# Patient Record
Sex: FEMALE | Race: WHITE | NOT HISPANIC OR LATINO | Employment: FULL TIME | URBAN - METROPOLITAN AREA
[De-identification: names, ages, dates, MRNs, and addresses within clinical notes are randomized per-mention and may not be internally consistent; named-entity substitution may affect disease eponyms.]

---
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
## 2018-02-23 ENCOUNTER — OFFICE VISIT (OUTPATIENT)
Dept: INTERNAL MEDICINE | Facility: CLINIC | Age: 18
End: 2018-02-23
Payer: COMMERCIAL

## 2018-02-23 VITALS
HEART RATE: 83 BPM | WEIGHT: 217.6 LBS | HEIGHT: 65 IN | RESPIRATION RATE: 16 BRPM | TEMPERATURE: 98.3 F | OXYGEN SATURATION: 97 % | DIASTOLIC BLOOD PRESSURE: 82 MMHG | SYSTOLIC BLOOD PRESSURE: 124 MMHG | BODY MASS INDEX: 36.25 KG/M2

## 2018-02-23 DIAGNOSIS — R07.0 THROAT PAIN: Primary | ICD-10-CM

## 2018-02-23 LAB
DEPRECATED S PYO AG THROAT QL EIA: NORMAL
FLUAV+FLUBV AG SPEC QL: NEGATIVE
FLUAV+FLUBV AG SPEC QL: NEGATIVE
SPECIMEN SOURCE: NORMAL
SPECIMEN SOURCE: NORMAL

## 2018-02-23 PROCEDURE — 99213 OFFICE O/P EST LOW 20 MIN: CPT | Performed by: PHYSICIAN ASSISTANT

## 2018-02-23 PROCEDURE — 87880 STREP A ASSAY W/OPTIC: CPT | Performed by: PHYSICIAN ASSISTANT

## 2018-02-23 PROCEDURE — 87081 CULTURE SCREEN ONLY: CPT | Performed by: PHYSICIAN ASSISTANT

## 2018-02-23 PROCEDURE — 87804 INFLUENZA ASSAY W/OPTIC: CPT | Performed by: PHYSICIAN ASSISTANT

## 2018-02-23 NOTE — PATIENT INSTRUCTIONS
Viral upper respiratory infection:    Symptomatic care is the mainstay of treatment at this time:    You can try a steroid nasal spray, flonase (fluticasone) or a saline nasal spray can be helpful.     You can also try Mucinex.     I would recommend drinking plenty of fluids to stay hydrated.     You can try a decongestant to help dry up some of nasal congestion such as sudafed.     A humidifier can be helpful, if you do not have one you can boil hot water on the stove    Your symptoms should slowly improve in the next 7-10 days    As long as your symptoms are improving, even if it is slow this is a good sign that your body is beating the viral infection.   If symptoms worsen or change (fever, chest pain, shortness of breath or weakness), I would recommend urgent follow up. I would also recommend follow up if your symptoms do not improve.

## 2018-02-23 NOTE — PROGRESS NOTES
"  SUBJECTIVE:   Cleo Flores is a 17 year old female who presents to clinic today for the following health issues:    Chief Complaint   Patient presents with     Throat Problem     Mucuas in the Throat, Sore throat that hurts all the time.      Sinus Problem     L ear is plugged and does hurt on and off, Pt states that she does get a headache on and off, pain above the eyes,  x 1 week     - symptoms: sore throat with mucous and painful swallowing, left ear plugging with pain, headache that comes and goes, post-nasal drip and cold sweats  - denies: cough, runny nose, sinus pain, chest pain   - pt has asthma, but notes no current concerns   - pt was exposed to influenza at work         Problem list and histories reviewed & adjusted, as indicated.  Additional history: as documented    Reviewed and updated as needed this visit by clinical staff  Tobacco  Allergies  Meds  Problems  Soc Hx      Reviewed and updated as needed this visit by Provider  Meds  Problems           OBJECTIVE:     /82  Pulse 83  Temp 98.3  F (36.8  C) (Oral)  Resp 16  Ht 5' 4.75\" (1.645 m)  Wt 217 lb 9.6 oz (98.7 kg)  LMP 02/06/2018  SpO2 97%  Breastfeeding? No  BMI 36.49 kg/m2  Body mass index is 36.49 kg/(m^2).  GENERAL: healthy, alert and no distress  EYES: Eyes grossly normal to inspection, PERRL and conjunctivae and sclerae normal  HENT: ear canals and TM's normal, nose and mouth without ulcers or lesions  NECK: cervical adenopathy mild   RESP: lungs clear to auscultation - no rales, rhonchi or wheezes  CV: regular rates and rhythm, normal S1 S2, no S3 or S4 and no murmur, click or rub    Diagnostic Test Results:  Results for orders placed or performed in visit on 02/23/18 (from the past 24 hour(s))   Strep, Rapid Screen   Result Value Ref Range    Specimen Description Throat     Rapid Strep A Screen       NEGATIVE: No Group A streptococcal antigen detected by immunoassay, await culture report.   Influenza A/B antigen "   Result Value Ref Range    Influenza A/B Agn Specimen Nasal     Influenza A Negative NEG^Negative    Influenza B Negative NEG^Negative       ASSESSMENT/PLAN:       1. Throat pain  - suspect viral origin at this time   - Strep, Rapid Screen  - Influenza A/B antigen  - Beta strep group A culture  - reviewed symptomatic care measures with mucinex, sudafed,  use of a humidifier, rest and hydration   - reviewed viral illnesses should heal in 7-10 days   - if symptoms worsen or fail to improve, follow up is needed   - reviewed worsening symptoms of fever, chest pain or shortness of breath require urgent follow up    - pt agreed to the above plan and all questions are answered       Steffanie Acosta PA-C  [facility]

## 2018-02-23 NOTE — MR AVS SNAPSHOT
After Visit Summary   2/23/2018    Cleo Flores    MRN: [id number]           Patient Information     Date Of Birth          2000        Visit Information        Provider Department      2/23/2018 8:40 AM Steffanie Acosta PA-C [facility]        Today's Diagnoses     Throat pain    -  1      Care Instructions    Viral upper respiratory infection:    Symptomatic care is the mainstay of treatment at this time:    You can try a steroid nasal spray, flonase (fluticasone) or a saline nasal spray can be helpful.     You can also try Mucinex.     I would recommend drinking plenty of fluids to stay hydrated.     You can try a decongestant to help dry up some of nasal congestion such as sudafed.     A humidifier can be helpful, if you do not have one you can boil hot water on the stove    Your symptoms should slowly improve in the next 7-10 days    As long as your symptoms are improving, even if it is slow this is a good sign that your body is beating the viral infection.   If symptoms worsen or change (fever, chest pain, shortness of breath or weakness), I would recommend urgent follow up. I would also recommend follow up if your symptoms do not improve.             Follow-ups after your visit        Who to contact     If you have questions or need follow up information about today's clinic visit or your schedule please contact [facility] directly at [phone].  Normal or non-critical lab and imaging results will be communicated to you by MyChart, letter or phone within 4 business days after the clinic has received the results. If you do not hear from us within 7 days, please contact the clinic through MyChart or phone. If you have a critical or abnormal lab result, we will notify you by phone as soon as possible.  Submit refill requests through [employer] or call your pharmacy and they will forward the refill request to us. Please allow 3 business days  "for your refill to be completed.          Additional Information About Your Visit        MyChart Information     [employer] lets you send messages to your doctor, view your test results, renew your prescriptions, schedule appointments and more. To sign up, go to [email or web address] . Click on \"Log in\" on the left side of the screen, which will take you to the Welcome page. Then click on \"Sign up Now\" on the right side of the page.     You will be asked to enter the access code listed below, as well as some personal information. Please follow the directions to create your username and password.     Your access code is: [email or web address]  Expires: 2018 10:50 PM     Your access code will  in 90 days. If you need help or a new code, please call your [facility] clinic or [phone].        Care EveryWhere ID     This is your [facility] EveryWhere ID. This could be used by other organizations to access your [facility] medical records  [id number]        Your Vitals Were     Pulse Temperature Respirations Height Last Period Pulse Oximetry    83 98.3  F (36.8  C) (Oral) 16 5' 4.75\" (1.645 m) 2018 97%    Breastfeeding? BMI (Body Mass Index)                No 36.49 kg/m2           Blood Pressure from Last 3 Encounters:   18 124/82   17 124/70   17 (!) 139/96    Weight from Last 3 Encounters:   18 217 lb 9.6 oz (98.7 kg) (99 %)*   17 221 lb (100.2 kg) (99 %)*   17 222 lb (100.7 kg) (99 %)*     * Growth percentiles are based on CDC 2-20 Years data.              We Performed the Following     Beta strep group A culture     Influenza A/B antigen     Strep, Rapid Screen        Primary Care Provider Office Phone # Fax #    Linda Martin [phone] [address] [place] [address]        Equal Access to Services     [facility] AH: Jenny Fernandez, emanuel [place], qaybta salvador martinez. " So [facility] [phone].    ATENCIÓN: Si león bruno, tiene a rogers disposición servicios gratuitos de asistencia lingüística. [facility] [phone].    We comply with applicable federal civil rights laws and [place] laws. We do not discriminate on the basis of race, color, national origin, age, disability, sex, sexual orientation, or gender identity.            Thank you!     Thank you for choosing [facility]  for your care. Our goal is always to provide you with excellent care. Hearing back from our patients is one way we can continue to improve our services. Please take a few minutes to complete the written survey that you may receive in the mail after your visit with us. Thank you!             Your Updated Medication List - Protect others around you: Learn how to safely use, store and throw away your medicines at www.disposemymeds.org.          This list is accurate as of 2/23/18  9:33 AM.  Always use your most recent med list.                   Brand Name Dispense Instructions for use Diagnosis    cyclobenzaprine 10 MG tablet    FLEXERIL    30 tablet    Take 0.5-1 tablets (5-10 mg) by mouth 3 times daily as needed for muscle spasms    Tension headache       fluticasone 50 MCG/ACT spray    FLONASE    1 Package    Spray 2 sprays into both nostrils daily    Seasonal allergies       SUMAtriptan 50 MG tablet    IMITREX    18 tablet    Take 1-2 tablets ( mg) by mouth at onset of headache for migraine May repeat in 2 hours. Max 4 tablets/24 hours.    Intractable chronic migraine without aura and without status migrainosus

## 2018-02-24 LAB
BACTERIA SPEC CULT: NORMAL
SPECIMEN SOURCE: NORMAL

## 2018-07-16 ENCOUNTER — TRANSFERRED RECORDS (OUTPATIENT)
Dept: HEALTH INFORMATION MANAGEMENT | Facility: CLINIC | Age: 18
End: 2018-07-16

## 2018-08-28 NOTE — ED PROVIDER NOTES
"  History     Chief Complaint:  Headache    The history is provided by the patient.      Cleo Flores is a 17 year old female with a history of migraines who presents with a headache. The patient states that she developed a headache that started on the right side of her head and traveled to the left. She took a Tylenol at 1400 which helped but after going out to dinner with her coworkers her headache worsened and she began vomiting and experiencing photophobia. She reports that her current headache feels like her typical migraine. Per the patient, she has had migraines since she was 7 and her most recent one was 1 month ago but it was not as severe as today. She is unsure what causes her migraines but she thinks that it ay be related to her sleeping positions. She denies any fever, chills, or other medical concerns. Of note she has never had any head imaging obtained.    Allergies:  The patient is negative for alcohol use.      Medications:    Imitrex    Past Medical History:    Intermittent asthma  Kidney stones  Migraines  MRSA cellulitis    Past Surgical History:    History reviewed. No pertinent surgical history.     Family History:    History reviewed. No pertinent family history.      Social History:  Presents with mother   Tobacco use: 1.00 PPD for the past year  Alcohol use: No  PCP: Linda Martin    Marital Status:  Single     Review of Systems   Constitutional: Negative for chills and fever.   Eyes: Positive for photophobia.   Gastrointestinal: Positive for vomiting.   Neurological: Positive for headaches.   All other systems reviewed and are negative.    Physical Exam     Patient Vitals for the past 24 hrs:   BP Temp Temp src Pulse Resp SpO2 Height Weight   11/28/17 2053 149/85 97.6  F (36.4  C) Oral 91 16 98 % 1.626 m (5' 4\") 100.7 kg (222 lb)      Physical Exam  GENERAL: well developed, pleasant  HEAD: atraumatic  EYES: pupils reactive, extraocular muscles intact, conjunctivae normal  ENT:  " Reviewed below information with Pippa Rodriguez NP, who feels that Mariusz should be seen and evaluated. Called Mariusz back and told him this. Mariusz also had me talk to his family friend Lisa  who is an RN. I told Lisa the nausea and poor appetite are not good signs in a heart failure patient. Also the intermittent chest pain and low BP post exercise concerns us and we feel he needs to be evaluated. Offered to see in [facility] tomorrow or if they have someone up there they can see in next day or so. She states they just established with a cardiologist up there, Dr Dodd, and will call him right now. Lisa thinks she can get him in to see Dr Dodd today. If not I told her we are happy to see him and please call us if need further assistance. She states understanding and agrees with plan.    mucus membranes moist  NECK:  trachea midline, normal range of motion  RESPIRATORY: no tachypnea, breath sounds clear to auscultation   CVS: normal S1/S2, no murmurs, intact distal pulses  ABDOMEN: soft, nontender, nondistention  MUSCULOSKELETAL: no deformities  SKIN: warm and dry, no acute rashes or ulceration  NEURO: GCS 15, cranial nerves intact, alert and oriented x3. Normal finger to nose  PSYCH:  Mood/affect normal     Emergency Department Course   Interventions:  2114: NS 1L IV Bolus   2116: Benadryl 25 mg IV  2119: Compazine 10 mg IV  2125: Toradol 30mg IV injection   2150: Benadryl 25 mg IV    Emergency Department Course:  Past medical records, nursing notes, and vitals reviewed.  2057: I performed an exam of the patient and obtained history, as documented above.      2227: I rechecked the patient. Findings and plan explained to the Patient and mother. Patient discharged home with instructions regarding supportive care, medications, and reasons to return. The importance of close follow-up was reviewed.    Impression & Plan    Medical Decision Making:  Cleo Flores is a 17 year old female presenting with migraines symptoms. I do not suspect aneurysm or infectious etiology. Patient given above treatment with improvement of symptoms. She does have slight restlessness in her legs. She was given a second dose of benadryl. Patient was sleeping but can easily be awoken and the akathisia from the compazine has subsided. Patient will be discharged to home and all questions were answered prior.    Diagnosis:    ICD-10-CM   1. Other migraine without status migrainosus, not intractable G43.809       Disposition:  Discharged to home with plan as outlined.    Discharge Medications:  New Prescriptions    BUTALBITAL-ACETAMINOPHEN-CAFFEINE (FIORICET/ESGIC) -40 MG PER TABLET    Take 1 tablet by mouth every 4 hours as needed    ONDANSETRON (ZOFRAN ODT) 4 MG ODT TAB    Take 1 tablet (4 mg) by mouth every 8 hours as  needed for nausea         James Diamond  11/28/2017    EMERGENCY DEPARTMENT  I, James Diamond, am serving as a scribe at 8:57 PM on 11/28/2017 to document services personally performed by Larry Maldonado MD based on my observations and the provider's statements to me.       Larry Maldonado MD  12/01/17 8630

## 2018-08-30 ENCOUNTER — TRANSFERRED RECORDS (OUTPATIENT)
Dept: HEALTH INFORMATION MANAGEMENT | Facility: CLINIC | Age: 18
End: 2018-08-30

## 2018-11-19 ENCOUNTER — TRANSFERRED RECORDS (OUTPATIENT)
Dept: HEALTH INFORMATION MANAGEMENT | Facility: CLINIC | Age: 18
End: 2018-11-19

## 2018-12-19 DIAGNOSIS — G43.719 INTRACTABLE CHRONIC MIGRAINE WITHOUT AURA AND WITHOUT STATUS MIGRAINOSUS: ICD-10-CM

## 2018-12-21 RX ORDER — SUMATRIPTAN 50 MG/1
TABLET, FILM COATED ORAL
Qty: 18 TABLET | Refills: 0 | Status: SHIPPED | OUTPATIENT
Start: 2018-12-21 | End: 2019-03-02

## 2018-12-21 NOTE — TELEPHONE ENCOUNTER
Medication is being filled for 1 time refill only due to:  Patient needs to be seen because it has been more than one year since last visit.     Odilia Antonio RN -- [facility]

## 2019-04-17 ENCOUNTER — OFFICE VISIT (OUTPATIENT)
Dept: PEDIATRICS | Facility: CLINIC | Age: 19
End: 2019-04-17
Payer: COMMERCIAL

## 2019-04-17 VITALS
OXYGEN SATURATION: 97 % | BODY MASS INDEX: 37.63 KG/M2 | WEIGHT: 220.4 LBS | DIASTOLIC BLOOD PRESSURE: 70 MMHG | RESPIRATION RATE: 16 BRPM | SYSTOLIC BLOOD PRESSURE: 122 MMHG | HEIGHT: 64 IN | TEMPERATURE: 97.6 F | HEART RATE: 84 BPM

## 2019-04-17 DIAGNOSIS — G43.719 INTRACTABLE CHRONIC MIGRAINE WITHOUT AURA AND WITHOUT STATUS MIGRAINOSUS: ICD-10-CM

## 2019-04-17 DIAGNOSIS — Z11.3 ROUTINE SCREENING FOR STI (SEXUALLY TRANSMITTED INFECTION): ICD-10-CM

## 2019-04-17 DIAGNOSIS — Z00.00 ENCOUNTER FOR PREVENTATIVE ADULT HEALTH CARE EXAMINATION: Primary | ICD-10-CM

## 2019-04-17 PROCEDURE — 87591 N.GONORRHOEAE DNA AMP PROB: CPT | Performed by: PEDIATRICS

## 2019-04-17 PROCEDURE — 99395 PREV VISIT EST AGE 18-39: CPT | Mod: GC | Performed by: STUDENT IN AN ORGANIZED HEALTH CARE EDUCATION/TRAINING PROGRAM

## 2019-04-17 PROCEDURE — 87491 CHLMYD TRACH DNA AMP PROBE: CPT | Performed by: PEDIATRICS

## 2019-04-17 RX ORDER — SUMATRIPTAN 50 MG/1
50 TABLET, FILM COATED ORAL
Qty: 15 TABLET | Refills: 11 | Status: SHIPPED | OUTPATIENT
Start: 2019-04-17 | End: 2019-05-22

## 2019-04-17 RX ORDER — SUMATRIPTAN 50 MG/1
TABLET, FILM COATED ORAL
Qty: 9 TABLET | Refills: 0 | Status: SHIPPED | OUTPATIENT
Start: 2019-04-17 | End: 2019-05-22

## 2019-04-17 ASSESSMENT — ENCOUNTER SYMPTOMS
HEMATOCHEZIA: 0
WEAKNESS: 0
JOINT SWELLING: 0
HEADACHES: 1
DIARRHEA: 0
FEVER: 0
CONSTIPATION: 0
ABDOMINAL PAIN: 0
SHORTNESS OF BREATH: 0
DYSURIA: 0
BREAST MASS: 0
ARTHRALGIAS: 0
DIZZINESS: 0
CHILLS: 0
EYE PAIN: 0
SORE THROAT: 0
COUGH: 0
HEARTBURN: 0
FREQUENCY: 0
MYALGIAS: 0
PALPITATIONS: 0
HEMATURIA: 0
NERVOUS/ANXIOUS: 1
PARESTHESIAS: 0
NAUSEA: 0

## 2019-04-17 ASSESSMENT — MIFFLIN-ST. JEOR: SCORE: 1754.73

## 2019-04-17 NOTE — PATIENT INSTRUCTIONS
Thanks for coming in today! Here is what we discussed:     1) STI testing today, we will call with results  2) Migraines - refilled imitrex. But we need to work on preventing migraines! My highest recommendations would be:    A) make time for more sleep   B) get rid of the vape pen ( you can go down to 0 nicotine first if you want)         Preventive Health Recommendations  Female Ages 18 to 20     Yearly exam:     See your health care provider every year in order to  o Review health changes.   o Discuss preventive care.    o Review your medicines if your doctor has prescribed any.      You should be tested each year for STDs (sexually transmitted diseases).       After age 20, talk to your provider about how often you should have cholesterol testing.      If you are at risk for diabetes, you should have a diabetes test (fasting glucose).     Shots:     Get a flu shot each year.     Get a tetanus shot every 10 years.     Consider getting the shot (vaccine) that prevents cervical cancer (Gardasil).    Nutrition:     Eat at least 5 servings of fruits and vegetables each day.    Eat whole-grain bread, whole-wheat pasta and brown rice instead of white grains and rice.    Get adequate Calcium and Vitamin D.     Lifestyle    Exercise at least 150 minutes a week each week (30 minutes a day, 5 days a week). This will help you control your weight and prevent disease.    No smoking.     Wear sunscreen to prevent skin cancer.    See your dentist every six months for an exam and cleaning.

## 2019-04-17 NOTE — PROGRESS NOTES
SUBJECTIVE:   CC: Cleo Flores is an 18 year old woman who presents for preventive health visit.     Healthy Habits:     Getting at least 3 servings of Calcium per day:  Yes    Bi-annual eye exam:  Yes    Dental care twice a year:  Yes    Sleep apnea or symptoms of sleep apnea:  None    Diet:  Regular (no restrictions)    Frequency of exercise:  1 day/week    Duration of exercise:  15-30 minutes    Taking medications regularly:  Yes    Medication side effects:  Not applicable    PHQ-2 Total Score: 0    Additional concerns today:  No    Migraines really bad in last 3 months, suspects from stress from school and work. Migraines 3x/ week. Imitrex helps at onset to stop migraine from coming on.     School [employer], 1 year left. Temping at [employer]. Wanting to get a full job, opens next week.     Works 6am to 2pm. Then goes to school.     Getting 4-5 hours of sleep per night.     Daily vaping before and after class, while driving. 3mg of nicotine. Drinks on weekends 2-5 beers per night. 5 caffeinated monster energy drinks per week. Used to be 3 redbulls per day.     -------------------------------------    Today's PHQ-2 Score:   PHQ-2 ( 1999 Pfizer) 4/17/2019   Q1: Little interest or pleasure in doing things 0   Q2: Feeling down, depressed or hopeless 0   PHQ-2 Score 0   Q1: Little interest or pleasure in doing things Not at all   Q2: Feeling down, depressed or hopeless Not at all   PHQ-2 Score 0       Abuse: Current or Past(Physical, Sexual or Emotional)- No  Do you feel safe in your environment? Yes    Social History     Tobacco Use     Smoking status: Former Smoker     Years: 1.00     Smokeless tobacco: Never Used     Tobacco comment: Smokes 1 cigarette a month since May 2016.   Substance Use Topics     Alcohol use: No     Alcohol/week: 0.0 oz     If you drink alcohol do you typically have >3 drinks per day or >7 drinks per week? Not applicable    Alcohol Use 4/17/2019   Prescreen: >3 drinks/day or  ">7 drinks/week? No   Prescreen: >3 drinks/day or >7 drinks/week? -   No flowsheet data found.    Reviewed orders with patient.  Reviewed health maintenance and updated orders accordingly - Yes      Mammogram not appropriate for this patient based on age.    Pertinent mammograms are reviewed under the imaging tab.  History of abnormal Pap smear: NO - under age 21, PAP not appropriate for age     Reviewed and updated as needed this visit by clinical staff  Tobacco  Allergies  Meds  Med Hx  Surg Hx  Fam Hx  Soc Hx        Reviewed and updated as needed this visit by Provider          Review of Systems   Constitutional: Negative for chills and fever.   HENT: Negative for congestion, ear pain, hearing loss and sore throat.    Eyes: Negative for pain and visual disturbance.   Respiratory: Negative for cough and shortness of breath.    Cardiovascular: Negative for chest pain, palpitations and peripheral edema.   Gastrointestinal: Negative for abdominal pain, constipation, diarrhea, heartburn, hematochezia and nausea.   Breasts:  Negative for tenderness, breast mass and discharge.   Genitourinary: Negative for dysuria, frequency, genital sores, hematuria, pelvic pain, urgency, vaginal bleeding and vaginal discharge.   Musculoskeletal: Negative for arthralgias, joint swelling and myalgias.   Skin: Negative for rash.   Neurological: Positive for headaches. Negative for dizziness, weakness and paresthesias.   Psychiatric/Behavioral: Negative for mood changes. The patient is nervous/anxious.         OBJECTIVE:   /70 (BP Location: Right arm, Patient Position: Sitting, Cuff Size: Adult Large)   Pulse 84   Temp 97.6  F (36.4  C) (Tympanic)   Resp 16   Ht 1.626 m (5' 4\")   Wt 100 kg (220 lb 6.4 oz)   SpO2 97%   BMI 37.83 kg/m    Physical Exam   GENERAL: healthy, alert and no distress  EYES: Eyes grossly normal to inspection, PERRL and conjunctivae and sclerae normal  NECK: no adenopathy, no asymmetry, masses, or " scars and thyroid normal to palpation  RESP: lungs clear to auscultation - no rales, rhonchi or wheezes  CV: regular rate and rhythm, normal S1 S2, no S3 or S4, no murmur, click or rub, no peripheral edema and peripheral pulses strong  ABDOMEN: soft, nontender, no hepatosplenomegaly, no masses and bowel sounds normal  MS: no gross musculoskeletal defects noted, no edema  SKIN: no suspicious lesions or rashes  PSYCH: mentation appears normal, affect normal/bright    Diagnostic Test Results:  none     ASSESSMENT/PLAN:   1. Encounter for preventative adult health care examination  Extensive discussion of healthy habits, mostly in context of preventing migraines. Discussed sleep hygiene as well as fitting in exercise into her busy life. Asthma a non-issue for her, hasn't needed inhaler in years. Will remove from problem list. Discussed relationship safety.     2. Intractable chronic migraine without aura and without status migrainosus  Extensive discussion of prevention, especially around increasing sleep. She would qualify for a daily preventive medicine but hated Topamax and wants to try lifestyle changes first.   - SUMAtriptan (IMITREX) 50 MG tablet; TAKE 1-2 TABLETS BY MOUTH AT ONSET OF HEADACHE FOR MIGRAINE. MAY REPEAT IN 2 HOURS. MAX OF 4 TABLETS IN 24 HOURS  Dispense: 9 tablet; Refill: 0  - SUMAtriptan (IMITREX) 50 MG tablet; Take 1 tablet (50 mg) by mouth at onset of headache for migraine  Dispense: 15 tablet; Refill: 11    3. Routine screening for STI (sexually transmitted infection)  Prev neg for HIV, syphillis, low risk.   - NEISSERIA GONORRHOEA PCR  - CHLAMYDIA TRACHOMATIS PCR    COUNSELING:  Reviewed preventive health counseling, as reflected in patient instructions  Special attention given to:        Regular exercise       Healthy diet/nutrition       Alcohol Use       Safe sex practices/STD prevention    BP Readings from Last 1 Encounters:   04/17/19 122/70     Estimated body mass index is 37.83 kg/m  as  "calculated from the following:    Height as of this encounter: 1.626 m (5' 4\").    Weight as of this encounter: 100 kg (220 lb 6.4 oz).    BP Screening:   Last 3 BP Readings:    BP Readings from Last 3 Encounters:   04/17/19 122/70   02/23/18 124/82   12/01/17 124/70       The following was recommended to the patient:  Re-screen BP within a year and recommended lifestyle modifications  Weight management plan: Discussed healthy diet and exercise guidelines     reports that she has quit smoking. She quit after 1.00 year of use. She has never used smokeless tobacco.  Tobacco Cessation Action Plan: Information offered: Patient not interested at this time  Self help information given to patient    Counseling Resources:  ATP IV Guidelines  Pooled Cohorts Equation Calculator  Breast Cancer Risk Calculator  FRAX Risk Assessment  ICSI Preventive Guidelines  Dietary Guidelines for Americans, 2010  USDA's MyPlate  ASA Prophylaxis  Lung CA Screening    Yamel Mccracken MD  [facility] JOSE EDUARDO    I have seen the patient, discussed with the resident and agree with the history, physical and plan as documented above.    Linda Martin MD  Internal Medicine - Pediatrics          "

## 2019-04-17 NOTE — LETTER
[address] [address]                  [phone]   April 20, 2019    [place] [address]      Dear Cleo,     Please find your recent labs enclosed for your review and records.     The results of your gonorrhea and chlamydia screening tests returned negative.     Warm regards,     Linda Martin MD   Internal Medicine - Pediatrics         Results for orders placed or performed in visit on 04/17/19   NEISSERIA GONORRHOEA PCR   Result Value Ref Range    Specimen Descrip Vagina     N Gonorrhea PCR Negative NEG^Negative   CHLAMYDIA TRACHOMATIS PCR   Result Value Ref Range    Specimen Description Vagina     Chlamydia Trachomatis PCR Negative NEG^Negative

## 2019-04-18 ASSESSMENT — ASTHMA QUESTIONNAIRES: ACT_TOTALSCORE: 24

## 2019-04-19 LAB
C TRACH DNA SPEC QL NAA+PROBE: NEGATIVE
N GONORRHOEA DNA SPEC QL NAA+PROBE: NEGATIVE
SPECIMEN SOURCE: NORMAL
SPECIMEN SOURCE: NORMAL

## 2019-04-29 ENCOUNTER — TRANSFERRED RECORDS (OUTPATIENT)
Dept: HEALTH INFORMATION MANAGEMENT | Facility: CLINIC | Age: 19
End: 2019-04-29

## 2019-05-21 DIAGNOSIS — G43.719 INTRACTABLE CHRONIC MIGRAINE WITHOUT AURA AND WITHOUT STATUS MIGRAINOSUS: ICD-10-CM

## 2019-05-21 NOTE — TELEPHONE ENCOUNTER
"Requested Prescriptions   Pending Prescriptions Disp Refills     SUMAtriptan (IMITREX) 50 MG tablet  Last Written Prescription Date:  4/17/19  Last Fill Quantity: 9,  # refills: 0    Last office visit: 4/17/2019 with prescribing provider:  Yamel Mccracken MD       Future Office Visit:     9 tablet 0     Sig: TAKE 1-2 TABLETS BY MOUTH AT ONSET OF HEADACHE FOR MIGRAINE. MAY REPEAT IN 2 HOURS. MAX OF 4 TABLETS IN 24 HOURS       Serotonin Agonists Failed - 5/21/2019  3:08 PM        Failed - Serotonin Agonist request needs review.     Please review patient's record. If patient has had 8 or more treatments in the past month, please forward to provider.          Passed - Blood pressure under 140/90 in past 12 months     BP Readings from Last 3 Encounters:   04/17/19 122/70   02/23/18 124/82   12/01/17 124/70                 Passed - Recent (12 mo) or future (30 days) visit within the authorizing provider's specialty     Patient had office visit in the last 12 months or has a visit in the next 30 days with authorizing provider or within the authorizing provider's specialty.  See \"Patient Info\" tab in inbasket, or \"Choose Columns\" in Meds & Orders section of the refill encounter.              Passed - Medication is active on med list        Passed - Patient is age 18 or older        Passed - No active pregnancy on record        Passed - No positive pregnancy test in past 12 months          "

## 2019-05-22 RX ORDER — SUMATRIPTAN 50 MG/1
TABLET, FILM COATED ORAL
Qty: 9 TABLET | Refills: 11 | Status: SHIPPED | OUTPATIENT
Start: 2019-05-22 | End: 2020-01-30

## 2019-12-17 ENCOUNTER — OFFICE VISIT (OUTPATIENT)
Dept: PEDIATRICS | Facility: CLINIC | Age: 19
End: 2019-12-17
Payer: COMMERCIAL

## 2019-12-17 VITALS
HEART RATE: 102 BPM | RESPIRATION RATE: 18 BRPM | TEMPERATURE: 98.7 F | SYSTOLIC BLOOD PRESSURE: 120 MMHG | DIASTOLIC BLOOD PRESSURE: 66 MMHG | OXYGEN SATURATION: 96 %

## 2019-12-17 DIAGNOSIS — Z20.828 CONTACT WITH OR EXPOSURE TO VIRAL DISEASE: ICD-10-CM

## 2019-12-17 DIAGNOSIS — N89.8 VAGINAL DISCHARGE: ICD-10-CM

## 2019-12-17 DIAGNOSIS — B37.31 CANDIDIASIS OF VAGINA: Primary | ICD-10-CM

## 2019-12-17 DIAGNOSIS — N89.8 VAGINAL ITCHING: ICD-10-CM

## 2019-12-17 DIAGNOSIS — R31.9 HEMATURIA, UNSPECIFIED TYPE: ICD-10-CM

## 2019-12-17 DIAGNOSIS — J06.9 VIRAL URI: ICD-10-CM

## 2019-12-17 LAB
ALBUMIN UR-MCNC: NEGATIVE MG/DL
APPEARANCE UR: CLEAR
BACTERIA #/AREA URNS HPF: ABNORMAL /HPF
BILIRUB UR QL STRIP: NEGATIVE
COLOR UR AUTO: YELLOW
FLUAV+FLUBV AG SPEC QL: NEGATIVE
FLUAV+FLUBV AG SPEC QL: NEGATIVE
GLUCOSE UR STRIP-MCNC: NEGATIVE MG/DL
HGB UR QL STRIP: ABNORMAL
KETONES UR STRIP-MCNC: NEGATIVE MG/DL
LEUKOCYTE ESTERASE UR QL STRIP: ABNORMAL
NITRATE UR QL: NEGATIVE
NON-SQ EPI CELLS #/AREA URNS LPF: ABNORMAL /LPF
PH UR STRIP: 5.5 PH (ref 5–7)
RBC #/AREA URNS AUTO: ABNORMAL /HPF
SOURCE: ABNORMAL
SP GR UR STRIP: 1.02 (ref 1–1.03)
SPECIMEN SOURCE: ABNORMAL
SPECIMEN SOURCE: NORMAL
UROBILINOGEN UR STRIP-ACNC: 0.2 EU/DL (ref 0.2–1)
WBC #/AREA URNS AUTO: ABNORMAL /HPF
WET PREP SPEC: ABNORMAL

## 2019-12-17 PROCEDURE — 87086 URINE CULTURE/COLONY COUNT: CPT | Performed by: NURSE PRACTITIONER

## 2019-12-17 PROCEDURE — 87210 SMEAR WET MOUNT SALINE/INK: CPT | Performed by: NURSE PRACTITIONER

## 2019-12-17 PROCEDURE — 87804 INFLUENZA ASSAY W/OPTIC: CPT | Performed by: NURSE PRACTITIONER

## 2019-12-17 PROCEDURE — 99203 OFFICE O/P NEW LOW 30 MIN: CPT | Performed by: NURSE PRACTITIONER

## 2019-12-17 PROCEDURE — 81001 URINALYSIS AUTO W/SCOPE: CPT | Performed by: NURSE PRACTITIONER

## 2019-12-17 RX ORDER — GLYCOPYRROLATE 2 MG/1
TABLET ORAL
Refills: 11 | COMMUNITY
Start: 2019-10-18 | End: 2024-02-13

## 2019-12-17 RX ORDER — FLUCONAZOLE 150 MG/1
150 TABLET ORAL ONCE
Qty: 1 TABLET | Refills: 0 | Status: SHIPPED | OUTPATIENT
Start: 2019-12-17 | End: 2020-01-30

## 2019-12-17 NOTE — PROGRESS NOTES
Subjective     Cleo Flores is a 19 year old female who presents to clinic today for the following health issues:    HPI   Vaginal Symptoms  Onset: 3 days    Description:  Vaginal Discharge: white creamy   Itching (Pruritis): YES  Burning sensation:  YES  Odor: YES    Accompanying Signs & Symptoms:  Pain with Urination: YES  Abdominal Pain: no   Fever: no     History:   Sexually active: YES  New Partner: no   Possibility of Pregnancy:  No    Precipitating factors:   Recent Antibiotic Use: no     Alleviating factors:  none    Therapies Tried and outcome: none    Not menstruating, expects her period in a couple days. Hx kidney stones but no pain.    Would also like to be tested for influenza due to exposure at home and symptoms (mom and step-dad).  Having sore throat, then developed headache, fatigue, runny nose.  No fevers but some chills.  Symptom onset 2 days ago.    Reviewed and updated as needed this visit by Provider  Meds  Problems       Review of Systems   ROS COMP: Constitutional, HEENT, cardiovascular, pulmonary, gi and gu systems are negative, except as otherwise noted.      Objective    /66 (BP Location: Right arm, Patient Position: Sitting, Cuff Size: Adult Large)   Pulse 102   Temp 98.7  F (37.1  C) (Tympanic)   Resp 18   LMP 11/20/2019 (Approximate)   SpO2 96%   There is no height or weight on file to calculate BMI.  Physical Exam   GENERAL: healthy, alert and no distress  EYES: Eyes grossly normal to inspection  HENT: ear canals and TM's normal, nose and mouth without ulcers or lesions  NECK: no adenopathy  RESP: lungs clear to auscultation - no rales, rhonchi or wheezes  CV: regular rate and rhythm, normal S1 S2, no S3 or S4, no murmur, click or rub  ABDOMEN: soft, nontender  BACK: no CVA tenderness  : deferred    Diagnostic Test Results:  Labs reviewed in Epic  Results for orders placed or performed in visit on 12/17/19 (from the past 24 hour(s))   Wet prep   Result Value Ref Range     Specimen Description Vagina     Wet Prep No Trichomonas seen     Wet Prep No clue cells seen     Wet Prep Few  Yeast seen   (A)     Wet Prep Few  WBC'S seen      UA with Microscopic reflex to Culture   Result Value Ref Range    Color Urine Yellow     Appearance Urine Clear     Glucose Urine Negative NEG^Negative mg/dL    Bilirubin Urine Negative NEG^Negative    Ketones Urine Negative NEG^Negative mg/dL    Specific Gravity Urine 1.020 1.003 - 1.035    pH Urine 5.5 5.0 - 7.0 pH    Protein Albumin Urine Negative NEG^Negative mg/dL    Urobilinogen Urine 0.2 0.2 - 1.0 EU/dL    Nitrite Urine Negative NEG^Negative    Blood Urine Trace (A) NEG^Negative    Leukocyte Esterase Urine Trace (A) NEG^Negative    Source Midstream Urine     WBC Urine 0 - 5 OTO5^0 - 5 /HPF    RBC Urine O - 2 OTO2^O - 2 /HPF    Squamous Epithelial /LPF Urine Few FEW^Few /LPF    Bacteria Urine Few (A) NEG^Negative /HPF           Assessment & Plan     1. Candidiasis of vagina  - fluconazole (DIFLUCAN) 150 MG tablet; Take 1 tablet (150 mg) by mouth once for 1 dose  Dispense: 1 tablet; Refill: 0    2. Vaginal discharge  Secondary to above.  - Urine Culture Aerobic Bacterial    3. Vaginal itching  Secondary to above.  - UA with Microscopic reflex to Culture  - Urine Culture Aerobic Bacterial    4. Contact with or exposure to viral disease  5. Viral URI  Neg influenza tests. Symptoms more consistent with viral URI, mild at this point.   - Influenza A/B antigen    5. Hematuria, unspecified type  UA not impressive for UTI and no signs of kidney stones (has hx of this); hold off on any abx at this time and UC pending. If UC is normal would like to repeat urine in 1 week, final plan pending results.  - Urine Culture Aerobic Bacterial    Patient Instructions   Patient Education     Take the diflucan medication x1.  I will keep you posted on final urine culture results.  I will call you with the influenza results. Do not think you need tamiflu either  way.  Yeast Infection (Candida Vaginal Infection)    You have a Candida vaginal infection. This is also known as a yeast infection. It is most often caused by a type of yeast (fungus) called Candida. Candida are normally found in the vagina. But if they increase in number, this can lead to infection and cause symptoms.  Symptoms of a yeast infection can include:    Clumpy or thin, white discharge, which may look like cottage cheese    Itching or burning    Burning with urination  Certain factors can make a yeast infection more likely. These can include:    Taking certain medicines, such as antibiotics or birth control pills    Pregnancy    Diabetes    Weak immune system  A yeast infection is most often treated with antifungal medicine. This may be given as a vaginal cream or pills you take by mouth. Treatment may last for about 1 to 7 days. Women with severe or recurrent infections may need longer courses of treatment.  Home care    If you re prescribed medicine, be sure to use it as directed. Finish all of the medicine, even if your symptoms go away. Note: Don t try to treat yourself using over-the-counter products without talking to your provider first. He or she will let you know if this is a good option for you.    Ask your provider what steps you can take to help reduce your risk of having a yeast infection in the future.  Follow-up care  Follow up with your healthcare provider, or as directed.  When to seek medical advice  Call your healthcare provider right away if:    You have a fever of 100.4 F (38 C) or higher, or as directed by your provider.    Your symptoms worsen, or they don t go away within a few days of starting treatment.    You have new pain in the lower belly or pelvic region.    You have side effects that bother you or a reaction to the cream or pills you re prescribed.    You or any partners you have sex with have new symptoms, such as a rash, joint pain, or sores.  Date Last Reviewed:  10/1/2017    2269-6365 [employer]. [address] [place] [address], [place] [address]. All rights reserved. This information is not intended as a substitute for professional medical care. Always follow your healthcare professional's instructions.               No follow-ups on file.    Jazmine Neumann NP  [facility]

## 2019-12-17 NOTE — PATIENT INSTRUCTIONS
Patient Education     Take the diflucan medication x1.  I will keep you posted on final urine culture results.  I will call you with the influenza results. Do not think you need tamiflu either way.  Yeast Infection (Candida Vaginal Infection)    You have a Candida vaginal infection. This is also known as a yeast infection. It is most often caused by a type of yeast (fungus) called Candida. Candida are normally found in the vagina. But if they increase in number, this can lead to infection and cause symptoms.  Symptoms of a yeast infection can include:    Clumpy or thin, white discharge, which may look like cottage cheese    Itching or burning    Burning with urination  Certain factors can make a yeast infection more likely. These can include:    Taking certain medicines, such as antibiotics or birth control pills    Pregnancy    Diabetes    Weak immune system  A yeast infection is most often treated with antifungal medicine. This may be given as a vaginal cream or pills you take by mouth. Treatment may last for about 1 to 7 days. Women with severe or recurrent infections may need longer courses of treatment.  Home care    If you re prescribed medicine, be sure to use it as directed. Finish all of the medicine, even if your symptoms go away. Note: Don t try to treat yourself using over-the-counter products without talking to your provider first. He or she will let you know if this is a good option for you.    Ask your provider what steps you can take to help reduce your risk of having a yeast infection in the future.  Follow-up care  Follow up with your healthcare provider, or as directed.  When to seek medical advice  Call your healthcare provider right away if:    You have a fever of 100.4 F (38 C) or higher, or as directed by your provider.    Your symptoms worsen, or they don t go away within a few days of starting treatment.    You have new pain in the lower belly or pelvic region.    You have side effects that  bother you or a reaction to the cream or pills you re prescribed.    You or any partners you have sex with have new symptoms, such as a rash, joint pain, or sores.  Date Last Reviewed: 10/1/2017    0509-7836 [employer]. [address] [place] [address]. All rights reserved. This information is not intended as a substitute for professional medical care. Always follow your healthcare professional's instructions.

## 2019-12-18 LAB
BACTERIA SPEC CULT: NORMAL
SPECIMEN SOURCE: NORMAL

## 2020-01-29 ENCOUNTER — PRE VISIT (OUTPATIENT)
Dept: PEDIATRICS | Facility: CLINIC | Age: 20
End: 2020-01-29

## 2020-01-29 NOTE — TELEPHONE ENCOUNTER
Pre-Visit Planning     Future Appointments   Date Time Provider Department Center   1/30/2020  8:00 AM Linda Martin MD [facility]     Arrival Time for this Appointment:    Appointment Notes for this encounter:   Data Unavailable    Questionnaires Reviewed/Assigned  No additional questionnaires are needed        Patient preferred phone number: [phone]    Unable to reach. Left voicemail. Advised patient to call clinic back at [phone].

## 2020-01-30 ENCOUNTER — OFFICE VISIT (OUTPATIENT)
Dept: PEDIATRICS | Facility: CLINIC | Age: 20
End: 2020-01-30
Payer: COMMERCIAL

## 2020-01-30 VITALS
HEIGHT: 64 IN | TEMPERATURE: 98.2 F | OXYGEN SATURATION: 99 % | BODY MASS INDEX: 39.57 KG/M2 | SYSTOLIC BLOOD PRESSURE: 129 MMHG | DIASTOLIC BLOOD PRESSURE: 83 MMHG | WEIGHT: 231.8 LBS | HEART RATE: 90 BPM

## 2020-01-30 DIAGNOSIS — G43.719 INTRACTABLE CHRONIC MIGRAINE WITHOUT AURA AND WITHOUT STATUS MIGRAINOSUS: ICD-10-CM

## 2020-01-30 DIAGNOSIS — B37.31 YEAST VAGINITIS: ICD-10-CM

## 2020-01-30 DIAGNOSIS — R30.0 DYSURIA: ICD-10-CM

## 2020-01-30 DIAGNOSIS — N76.0 BACTERIAL VAGINOSIS: ICD-10-CM

## 2020-01-30 DIAGNOSIS — N20.0 KIDNEY STONE: Primary | ICD-10-CM

## 2020-01-30 DIAGNOSIS — B96.89 BACTERIAL VAGINOSIS: ICD-10-CM

## 2020-01-30 LAB
ALBUMIN SERPL-MCNC: 4.4 G/DL (ref 3.4–5)
ALBUMIN UR-MCNC: NEGATIVE MG/DL
ALP SERPL-CCNC: 74 U/L (ref 40–150)
ALT SERPL W P-5'-P-CCNC: 19 U/L (ref 0–50)
ANION GAP SERPL CALCULATED.3IONS-SCNC: 4 MMOL/L (ref 3–14)
APPEARANCE UR: CLEAR
AST SERPL W P-5'-P-CCNC: 11 U/L (ref 0–45)
BACTERIA #/AREA URNS HPF: ABNORMAL /HPF
BILIRUB SERPL-MCNC: 0.2 MG/DL (ref 0.2–1.3)
BILIRUB UR QL STRIP: NEGATIVE
BUN SERPL-MCNC: 14 MG/DL (ref 7–30)
CALCIUM SERPL-MCNC: 9.4 MG/DL (ref 8.5–10.1)
CHLORIDE SERPL-SCNC: 107 MMOL/L (ref 94–109)
CO2 SERPL-SCNC: 27 MMOL/L (ref 20–32)
COLOR UR AUTO: YELLOW
CREAT SERPL-MCNC: 0.72 MG/DL (ref 0.52–1.04)
ERYTHROCYTE [DISTWIDTH] IN BLOOD BY AUTOMATED COUNT: 13.4 % (ref 10–15)
GFR SERPL CREATININE-BSD FRML MDRD: >90 ML/MIN/{1.73_M2}
GLUCOSE SERPL-MCNC: 97 MG/DL (ref 70–99)
GLUCOSE UR STRIP-MCNC: NEGATIVE MG/DL
HCT VFR BLD AUTO: 39.2 % (ref 35–47)
HGB BLD-MCNC: 12.7 G/DL (ref 11.7–15.7)
HGB UR QL STRIP: ABNORMAL
KETONES UR STRIP-MCNC: NEGATIVE MG/DL
LEUKOCYTE ESTERASE UR QL STRIP: NEGATIVE
MCH RBC QN AUTO: 28.7 PG (ref 26.5–33)
MCHC RBC AUTO-ENTMCNC: 32.4 G/DL (ref 31.5–36.5)
MCV RBC AUTO: 89 FL (ref 78–100)
NITRATE UR QL: NEGATIVE
NON-SQ EPI CELLS #/AREA URNS LPF: ABNORMAL /LPF
PH UR STRIP: 7 PH (ref 5–7)
PLATELET # BLD AUTO: 422 10E9/L (ref 150–450)
POTASSIUM SERPL-SCNC: 4.5 MMOL/L (ref 3.4–5.3)
PROT SERPL-MCNC: 8.2 G/DL (ref 6.8–8.8)
RBC # BLD AUTO: 4.43 10E12/L (ref 3.8–5.2)
RBC #/AREA URNS AUTO: ABNORMAL /HPF
SODIUM SERPL-SCNC: 138 MMOL/L (ref 133–144)
SOURCE: ABNORMAL
SP GR UR STRIP: 1.02 (ref 1–1.03)
SPECIMEN SOURCE: ABNORMAL
UROBILINOGEN UR STRIP-ACNC: 0.2 EU/DL (ref 0.2–1)
WBC # BLD AUTO: 11.6 10E9/L (ref 4–11)
WBC #/AREA URNS AUTO: ABNORMAL /HPF
WET PREP SPEC: ABNORMAL

## 2020-01-30 PROCEDURE — 36415 COLL VENOUS BLD VENIPUNCTURE: CPT | Performed by: PEDIATRICS

## 2020-01-30 PROCEDURE — 99214 OFFICE O/P EST MOD 30 MIN: CPT | Performed by: PEDIATRICS

## 2020-01-30 PROCEDURE — 87491 CHLMYD TRACH DNA AMP PROBE: CPT | Performed by: PEDIATRICS

## 2020-01-30 PROCEDURE — 85027 COMPLETE CBC AUTOMATED: CPT | Performed by: PEDIATRICS

## 2020-01-30 PROCEDURE — 80053 COMPREHEN METABOLIC PANEL: CPT | Performed by: PEDIATRICS

## 2020-01-30 PROCEDURE — 87210 SMEAR WET MOUNT SALINE/INK: CPT | Performed by: PEDIATRICS

## 2020-01-30 PROCEDURE — 87591 N.GONORRHOEAE DNA AMP PROB: CPT | Performed by: PEDIATRICS

## 2020-01-30 PROCEDURE — 81001 URINALYSIS AUTO W/SCOPE: CPT | Performed by: PEDIATRICS

## 2020-01-30 RX ORDER — METRONIDAZOLE 500 MG/1
500 TABLET ORAL 2 TIMES DAILY
Qty: 14 TABLET | Refills: 0 | Status: SHIPPED | OUTPATIENT
Start: 2020-01-30 | End: 2020-02-06

## 2020-01-30 RX ORDER — SUMATRIPTAN 50 MG/1
TABLET, FILM COATED ORAL
Qty: 18 TABLET | Refills: 11 | Status: SHIPPED | OUTPATIENT
Start: 2020-01-30 | End: 2020-06-01

## 2020-01-30 RX ORDER — FLUCONAZOLE 150 MG/1
150 TABLET ORAL ONCE
Qty: 1 TABLET | Refills: 0 | Status: SHIPPED | OUTPATIENT
Start: 2020-01-30 | End: 2020-06-02

## 2020-01-30 ASSESSMENT — MIFFLIN-ST. JEOR: SCORE: 1801.44

## 2020-01-30 NOTE — PATIENT INSTRUCTIONS
[facility] Radiology [phone] - call to schedule your kidney ultrasound    Labs today    I'll send you your results through [employer]    Start diflucan for yeast infection    Start flagyl twice daily for 7 days for BV - no alcohol on this medication    Come back to see me for a physical with a pap smear sometime this spring

## 2020-01-30 NOTE — PROGRESS NOTES
"Subjective     Cleo Flores is a 19 year old female who presents to clinic today for the following health issues:    HPI   Vaginal Symptoms  Duration of complaint: patient here to follow up from 12/17/2019  Symptoms has improved   Pt report urine cloudy- intermitted   Some abdominal pain   Hx of kidney stones    Seen 12/17 and treated with diflucan and had a relatively normal urine test at that time.      Recently getting cloudy urine.  Urine smells weird all the time.    Last kidney stone at age 14.  Concerned about possibility of having recurrent stones with the abdominal pain that she is having.    Abdominal pain almost every day.  Come and goes - collicky.  Resolves on own - no clear positional or food trigger.  Strong family history of stones.   Last imaging in 2012.  Hasn't noticed hematuria, but has had red cells in her urine on UA examinations.    Still having vaginal discharge - gelatinous, some itching.   Open to STI testing.  One current female partner.      Migraines have been better - getting on average once per week now.  Imitrex is very effective, but some months, does not have enough.    Reviewed and updated as needed this visit by Provider         Review of Systems   ROS COMP: Constitutional, cardiovascular, pulmonary, gi and gu systems are negative, except as otherwise noted.      Objective    /83   Pulse 90   Temp 98.2  F (36.8  C) (Oral)   Ht 1.626 m (5' 4\")   Wt 105.1 kg (231 lb 12.8 oz)   SpO2 99%   BMI 39.79 kg/m    Body mass index is 39.79 kg/m .   Wt Readings from Last 4 Encounters:   01/30/20 105.1 kg (231 lb 12.8 oz)   04/17/19 100 kg (220 lb 6.4 oz)   02/23/18 98.7 kg (217 lb 9.6 oz) (99 %)*   12/01/17 100.2 kg (221 lb) (99 %)*     * Growth percentiles are based on CDC (Girls, 2-20 Years) data.       Physical Exam   GENERAL: healthy, alert and no distress  EYES: Eyes grossly normal to inspection, PERRL and conjunctivae and sclerae normal  HENT: ear canals and TM's normal, " nose and mouth without ulcers or lesions  NECK: no adenopathy, no asymmetry, masses, or scars and thyroid normal to palpation  RESP: lungs clear to auscultation - no rales, rhonchi or wheezes  CV: regular rate and rhythm, normal S1 S2, no S3 or S4, no murmur, click or rub, no peripheral edema and peripheral pulses strong  ABDOMEN: soft, mild tenderness over epigastric region, no rebound or guarding  BACK: no CVA tenderness  PSYCH: mentation appears normal, affect normal/bright    Diagnostic Test Results:  Results for orders placed or performed in visit on 01/30/20 (from the past 24 hour(s))   Wet prep   Result Value Ref Range    Specimen Description Vagina     Wet Prep Moderate  Clue cells seen   (A)     Wet Prep Few  Yeast seen   (A)     Wet Prep No WBC's seen     Wet Prep No Trichomonas seen    UA reflex to Microscopic and Culture   Result Value Ref Range    Color Urine Yellow     Appearance Urine Clear     Glucose Urine Negative NEG^Negative mg/dL    Bilirubin Urine Negative NEG^Negative    Ketones Urine Negative NEG^Negative mg/dL    Specific Gravity Urine 1.020 1.003 - 1.035    Blood Urine Trace (A) NEG^Negative    pH Urine 7.0 5.0 - 7.0 pH    Protein Albumin Urine Negative NEG^Negative mg/dL    Urobilinogen Urine 0.2 0.2 - 1.0 EU/dL    Nitrite Urine Negative NEG^Negative    Leukocyte Esterase Urine Negative NEG^Negative    Source Midstream Urine    Urine Microscopic   Result Value Ref Range    WBC Urine 0 - 5 OTO5^0 - 5 /HPF    RBC Urine O - 2 OTO2^O - 2 /HPF    Squamous Epithelial /LPF Urine Few FEW^Few /LPF    Bacteria Urine Few (A) NEG^Negative /HPF   CBC with platelets   Result Value Ref Range    WBC 11.6 (H) 4.0 - 11.0 10e9/L    RBC Count 4.43 3.8 - 5.2 10e12/L    Hemoglobin 12.7 11.7 - 15.7 g/dL    Hematocrit 39.2 35.0 - 47.0 %    MCV 89 78 - 100 fl    MCH 28.7 26.5 - 33.0 pg    MCHC 32.4 31.5 - 36.5 g/dL    RDW 13.4 10.0 - 15.0 %    Platelet Count 422 150 - 450 10e9/L     Additional labs pending     "    Assessment & Plan       ICD-10-CM    1. Kidney stone N20.0 CBC with platelets     Comprehensive metabolic panel     US Renal Complete     Urine Microscopic    Long history of symptoms c/w nephrolithiasis.  Plan on imaging and labs today.  Further urology involvement pending results.     2. Dysuria R30.0 Wet prep     UA reflex to Microscopic and Culture     Chlamydia trachomatis PCR     Neisseria gonorrhoeae PCR     Secondary to BV/yeast vaginitis - treat and follow.  No evidence of UTI today.     3. Bacterial vaginosis N76.0 metroNIDAZOLE (FLAGYL) 500 MG tablet  Reviewed medication, patient to alert me if not improving    B96.89    4. Yeast vaginitis B37.3 fluconazole (DIFLUCAN) 150 MG tablet   5. Intractable chronic migraine without aura and without status migrainosus G43.719 SUMAtriptan (IMITREX) 50 MG tablet  Improved, continue imitrex        BMI:   Estimated body mass index is 39.79 kg/m  as calculated from the following:    Height as of this encounter: 1.626 m (5' 4\").    Weight as of this encounter: 105.1 kg (231 lb 12.8 oz).   Weight management plan: Discussed healthy diet and exercise guidelines        Patient Instructions   [facility] Radiology [phone] - call to schedule your kidney ultrasound    Labs today    I'll send you your results through [employer]    Start diflucan for yeast infection    Start flagyl twice daily for 7 days for BV - no alcohol on this medication    Come back to see me for a physical with a pap smear sometime this spring      Return in about 4 months (around 2020) for Routine Visit.    Linda Martin MD  [facility] JOSE EDUARDO      "

## 2020-02-11 ENCOUNTER — HOSPITAL ENCOUNTER (OUTPATIENT)
Dept: ULTRASOUND IMAGING | Facility: CLINIC | Age: 20
Discharge: HOME | End: 2020-02-11
Attending: PEDIATRICS | Admitting: PEDIATRICS
Payer: COMMERCIAL

## 2020-02-11 DIAGNOSIS — N20.0 KIDNEY STONE: ICD-10-CM

## 2020-02-11 PROCEDURE — 76770 US EXAM ABDO BACK WALL COMP: CPT

## 2020-03-22 ENCOUNTER — HEALTH MAINTENANCE LETTER (OUTPATIENT)
Age: 20
End: 2020-03-22

## 2020-05-07 ENCOUNTER — TRANSFERRED RECORDS (OUTPATIENT)
Dept: HEALTH INFORMATION MANAGEMENT | Facility: CLINIC | Age: 20
End: 2020-05-07

## 2020-05-15 ENCOUNTER — E-VISIT (OUTPATIENT)
Dept: PEDIATRICS | Facility: CLINIC | Age: 20
End: 2020-05-15
Payer: COMMERCIAL

## 2020-05-15 DIAGNOSIS — G43.719 INTRACTABLE CHRONIC MIGRAINE WITHOUT AURA AND WITHOUT STATUS MIGRAINOSUS: Primary | ICD-10-CM

## 2020-05-15 PROCEDURE — 99421 OL DIG E/M SVC 5-10 MIN: CPT | Performed by: NURSE PRACTITIONER

## 2020-05-15 RX ORDER — CYCLOBENZAPRINE HCL 5 MG
5-10 TABLET ORAL 3 TIMES DAILY PRN
Qty: 30 TABLET | Refills: 0 | Status: SHIPPED | OUTPATIENT
Start: 2020-05-15 | End: 2021-04-15

## 2020-05-22 ENCOUNTER — E-VISIT (OUTPATIENT)
Dept: PEDIATRICS | Facility: CLINIC | Age: 20
End: 2020-05-22

## 2020-05-22 DIAGNOSIS — N89.8 VAGINAL DISCHARGE: Primary | ICD-10-CM

## 2020-05-22 DIAGNOSIS — N89.8 VAGINAL DISCHARGE: ICD-10-CM

## 2020-05-22 DIAGNOSIS — R31.9 HEMATURIA, UNSPECIFIED TYPE: ICD-10-CM

## 2020-05-22 DIAGNOSIS — B37.31 CANDIDAL VULVOVAGINITIS: ICD-10-CM

## 2020-05-22 LAB
ALBUMIN UR-MCNC: NEGATIVE MG/DL
APPEARANCE UR: CLEAR
BILIRUB UR QL STRIP: NEGATIVE
COLOR UR AUTO: YELLOW
GLUCOSE UR STRIP-MCNC: NEGATIVE MG/DL
HGB UR QL STRIP: NEGATIVE
KETONES UR STRIP-MCNC: NEGATIVE MG/DL
LEUKOCYTE ESTERASE UR QL STRIP: NEGATIVE
NITRATE UR QL: NEGATIVE
NON-SQ EPI CELLS #/AREA URNS LPF: ABNORMAL /LPF
PH UR STRIP: 5.5 PH (ref 5–7)
RBC #/AREA URNS AUTO: ABNORMAL /HPF
SOURCE: ABNORMAL
SP GR UR STRIP: <=1.005 (ref 1–1.03)
SPECIMEN SOURCE: ABNORMAL
UROBILINOGEN UR STRIP-ACNC: 0.2 EU/DL (ref 0.2–1)
WBC #/AREA URNS AUTO: ABNORMAL /HPF
WET PREP SPEC: ABNORMAL

## 2020-05-22 PROCEDURE — 87210 SMEAR WET MOUNT SALINE/INK: CPT | Performed by: INTERNAL MEDICINE

## 2020-05-22 PROCEDURE — 99421 OL DIG E/M SVC 5-10 MIN: CPT | Performed by: INTERNAL MEDICINE

## 2020-05-22 PROCEDURE — 81001 URINALYSIS AUTO W/SCOPE: CPT | Performed by: NURSE PRACTITIONER

## 2020-05-22 RX ORDER — FLUCONAZOLE 150 MG/1
150 TABLET ORAL ONCE
Qty: 2 TABLET | Refills: 0 | Status: SHIPPED | OUTPATIENT
Start: 2020-05-22 | End: 2020-06-02

## 2020-05-22 NOTE — PATIENT INSTRUCTIONS
Thank you for choosing us for your care. I have placed an order for a prescription so that you can start treatment. View your full visit summary for details by clicking on the link below. Your pharmacist will able to address any questions you may have about the medication.     If you re not feeling better within 2-3 days, please schedule an appointment.  You can schedule an appointment right here in [employer], or call [phone]  If the visit is for the same symptoms as your e-visit, we ll refund the cost of your e-visit if seen within seven days.      Yeast Infection (Candida Vaginal Infection)    You have a Candida vaginal infection. This is also known as a yeast infection. It is most often caused by a type of yeast (fungus) called Candida. Candida are normally found in the vagina. But if they increase in number, this can lead to infection and cause symptoms.  Symptoms of a yeast infection can include:    Clumpy or thin, white discharge, which may look like cottage cheese    Itching or burning    Burning with urination  Certain factors can make a yeast infection more likely. These can include:    Taking certain medicines, such as antibiotics or birth control pills    Pregnancy    Diabetes    Weak immune system  A yeast infection is most often treated with antifungal medicine. This may be given as a vaginal cream or pills you take by mouth. Treatment may last for about 1 to 7 days. Women with severe or recurrent infections may need longer courses of treatment.  Home care    If you re prescribed medicine, be sure to use it as directed. Finish all of the medicine, even if your symptoms go away. Note: Don t try to treat yourself using over-the-counter products without talking to your provider first. He or she will let you know if this is a good option for you.    Ask your provider what steps you can take to help reduce your risk of having a yeast infection in the future.  Follow-up care  Follow up with your  healthcare provider, or as directed.  When to seek medical advice  Call your healthcare provider right away if:    You have a fever of 100.4 F (38 C) or higher, or as directed by your provider.    Your symptoms worsen, or they don t go away within a few days of starting treatment.    You have new pain in the lower belly or pelvic region.    You have side effects that bother you or a reaction to the cream or pills you re prescribed.    You or any partners you have sex with have new symptoms, such as a rash, joint pain, or sores.  Date Last Reviewed: 10/1/2017    3859-6824 [employer]. [address] [place] [address]. All rights reserved. This information is not intended as a substitute for professional medical care. Always follow your healthcare professional's instructions.

## 2020-09-28 ENCOUNTER — TRANSFERRED RECORDS (OUTPATIENT)
Dept: HEALTH INFORMATION MANAGEMENT | Facility: CLINIC | Age: 20
End: 2020-09-28

## 2020-11-02 ENCOUNTER — TRANSFERRED RECORDS (OUTPATIENT)
Dept: HEALTH INFORMATION MANAGEMENT | Facility: CLINIC | Age: 20
End: 2020-11-02

## 2020-11-17 ASSESSMENT — ENCOUNTER SYMPTOMS
DIARRHEA: 0
BREAST MASS: 0
NAUSEA: 0
MYALGIAS: 0
PARESTHESIAS: 0
CHILLS: 0
NERVOUS/ANXIOUS: 0
PALPITATIONS: 0
HEMATURIA: 0
FEVER: 0
WEAKNESS: 0
CONSTIPATION: 0
JOINT SWELLING: 0
HEMATOCHEZIA: 0
ARTHRALGIAS: 0
ABDOMINAL PAIN: 0
SHORTNESS OF BREATH: 0
HEARTBURN: 0
DIZZINESS: 0
SORE THROAT: 0
EYE PAIN: 0
COUGH: 0
FREQUENCY: 1
DYSURIA: 0
HEADACHES: 0

## 2020-11-18 ENCOUNTER — OFFICE VISIT (OUTPATIENT)
Dept: FAMILY MEDICINE | Facility: CLINIC | Age: 20
End: 2020-11-18
Payer: COMMERCIAL

## 2020-11-18 VITALS
TEMPERATURE: 98.2 F | SYSTOLIC BLOOD PRESSURE: 130 MMHG | HEIGHT: 65 IN | OXYGEN SATURATION: 96 % | WEIGHT: 232 LBS | BODY MASS INDEX: 38.65 KG/M2 | HEART RATE: 94 BPM | DIASTOLIC BLOOD PRESSURE: 80 MMHG

## 2020-11-18 DIAGNOSIS — Z00.00 ROUTINE GENERAL MEDICAL EXAMINATION AT A HEALTH CARE FACILITY: Primary | ICD-10-CM

## 2020-11-18 DIAGNOSIS — Z12.4 SCREENING FOR MALIGNANT NEOPLASM OF CERVIX: ICD-10-CM

## 2020-11-18 DIAGNOSIS — Z11.3 SCREEN FOR STD (SEXUALLY TRANSMITTED DISEASE): ICD-10-CM

## 2020-11-18 PROCEDURE — G0145 SCR C/V CYTO,THINLAYER,RESCR: HCPCS | Performed by: NURSE PRACTITIONER

## 2020-11-18 PROCEDURE — 99395 PREV VISIT EST AGE 18-39: CPT | Performed by: NURSE PRACTITIONER

## 2020-11-18 PROCEDURE — 87591 N.GONORRHOEAE DNA AMP PROB: CPT | Performed by: NURSE PRACTITIONER

## 2020-11-18 PROCEDURE — 87491 CHLMYD TRACH DNA AMP PROBE: CPT | Performed by: NURSE PRACTITIONER

## 2020-11-18 ASSESSMENT — ENCOUNTER SYMPTOMS
ARTHRALGIAS: 0
MYALGIAS: 0
NAUSEA: 0
DYSURIA: 0
HEMATURIA: 0
PALPITATIONS: 0
CONSTIPATION: 0
FEVER: 0
COUGH: 0
DIARRHEA: 0
SORE THROAT: 0
FREQUENCY: 1
EYE PAIN: 0
HEARTBURN: 0
HEADACHES: 0
DIZZINESS: 0
CHILLS: 0
HEMATOCHEZIA: 0
ABDOMINAL PAIN: 0
NERVOUS/ANXIOUS: 0
JOINT SWELLING: 0
BREAST MASS: 0
SHORTNESS OF BREATH: 0
PARESTHESIAS: 0
WEAKNESS: 0

## 2020-11-18 ASSESSMENT — MIFFLIN-ST. JEOR: SCORE: 1805.29

## 2020-11-18 NOTE — PROGRESS NOTES
SUBJECTIVE:   CC: Cleo Flores is an 20 year old woman who presents for preventive health visit.       Patient has been advised of split billing requirements and indicates understanding: Yes  Healthy Habits:     Getting at least 3 servings of Calcium per day:  Yes    Bi-annual eye exam:  Yes    Dental care twice a year:  Yes    Sleep apnea or symptoms of sleep apnea:  None    Diet:  Regular (no restrictions)    Frequency of exercise:  2-3 days/week    Duration of exercise:  15-30 minutes    Taking medications regularly:  Yes    Medication side effects:  None    PHQ-2 Total Score: 1    Additional concerns today:  No      Here for pap.  No concerns.  Sexually active, female partner, monogamous.    Today's PHQ-2 Score:   PHQ-2 ( 1999 [employer]) 11/17/2020   Q1: Little interest or pleasure in doing things 1   Q2: Feeling down, depressed or hopeless 0   PHQ-2 Score 1   Q1: Little interest or pleasure in doing things Several days   Q2: Feeling down, depressed or hopeless Not at all   PHQ-2 Score 1       Abuse: Current or Past (Physical, Sexual or Emotional) - No  Do you feel safe in your environment? Yes        Social History     Tobacco Use     Smoking status: Former Smoker     Years: 1.00     Smokeless tobacco: Never Used     Tobacco comment: Smokes 1 cigarette a month since May 2016.   Substance Use Topics     Alcohol use: No     Alcohol/week: 0.0 standard drinks     If you drink alcohol do you typically have >3 drinks per day or >7 drinks per week? No    Alcohol Use 11/18/2020   Prescreen: >3 drinks/day or >7 drinks/week? -   Prescreen: >3 drinks/day or >7 drinks/week? No       Reviewed orders with patient.  Reviewed health maintenance and updated orders accordingly - Yes  Patient Active Problem List   Diagnosis     Kidney stones     Plantar warts     Tension headache     Intractable chronic migraine without aura     Morbid obesity, unspecified obesity type (H)     Past Surgical History:   Procedure Laterality Date  "    NO HISTORY OF SURGERY         Social History     Tobacco Use     Smoking status: Former Smoker     Years: 1.00     Smokeless tobacco: Never Used     Tobacco comment: Smokes 1 cigarette a month since May 2016.   Substance Use Topics     Alcohol use: No     Alcohol/week: 0.0 standard drinks     Family History   Problem Relation Age of Onset     Family History Negative Mother      Hypertension Mother      Diabetes Mother      Liver Cancer Mother      Liver Disease Mother      Family History Negative Father            Mammogram not appropriate for this patient based on age.    Pertinent mammograms are reviewed under the imaging tab.  History of abnormal Pap smear: NO - age 21-29 PAP every 3 years recommended     Reviewed and updated as needed this visit by clinical staff  Tobacco   Meds     Fam Hx  Soc Hx        Reviewed and updated as needed this visit by Provider                    Review of Systems   Constitutional: Negative for chills and fever.   HENT: Positive for ear pain. Negative for congestion, hearing loss and sore throat.    Eyes: Negative for pain and visual disturbance.   Respiratory: Negative for cough and shortness of breath.    Cardiovascular: Negative for chest pain, palpitations and peripheral edema.   Gastrointestinal: Negative for abdominal pain, constipation, diarrhea, heartburn, hematochezia and nausea.   Breasts:  Negative for tenderness, breast mass and discharge.   Genitourinary: Positive for frequency, urgency and vaginal discharge. Negative for dysuria, genital sores, hematuria, pelvic pain and vaginal bleeding.   Musculoskeletal: Negative for arthralgias, joint swelling and myalgias.   Skin: Negative for rash.   Neurological: Negative for dizziness, weakness, headaches and paresthesias.   Psychiatric/Behavioral: Negative for mood changes. The patient is not nervous/anxious.           OBJECTIVE:   /80   Pulse 94   Temp 98.2  F (36.8  C) (Oral)   Ht 1.638 m (5' 4.5\")   Wt " "105.2 kg (232 lb)   LMP 11/01/2020 (Approximate)   SpO2 96%   BMI 39.21 kg/m    Physical Exam  GENERAL: healthy, alert and no distress  EYES: Eyes grossly normal to inspection  HENT: ear canals and TM's normal, nose and mouth without ulcers or lesions  NECK: no adenopathy, no asymmetry, masses, or scars and thyroid normal to palpation  RESP: lungs clear to auscultation - no rales, rhonchi or wheezes  BREAST: normal without masses, tenderness or nipple discharge and no palpable axillary masses or adenopathy  CV: regular rate and rhythm, normal S1 S2, no S3 or S4, no murmur, click or rub, no peripheral edema and peripheral pulses strong  ABDOMEN: soft, nontender, no hepatosplenomegaly, no masses and bowel sounds normal   (female): normal female external genitalia, normal urethral meatus, vaginal mucosa pink, moist, well rugated, and normal cervix  NEURO: Normal strength and tone, mentation intact and speech normal  PSYCH: mentation appears normal, affect normal/bright    Diagnostic Test Results:  Labs reviewed in Epic    ASSESSMENT/PLAN:   1. Routine general medical examination at a health care facility    2. Screening for malignant neoplasm of cervix  - Pap imaged thin layer screen only - recommended age 21 - 24 years    3. Screen for STD (sexually transmitted disease)  - Neisseria gonorrhoeae PCR  - Chlamydia trachomatis PCR    Patient has been advised of split billing requirements and indicates understanding: Yes  COUNSELING:  Reviewed preventive health counseling, as reflected in patient instructions    Estimated body mass index is 39.21 kg/m  as calculated from the following:    Height as of this encounter: 1.638 m (5' 4.5\").    Weight as of this encounter: 105.2 kg (232 lb).        She reports that she has quit smoking. She quit after 1.00 year of use. She has never used smokeless tobacco.      Counseling Resources:  ATP IV Guidelines  Pooled Cohorts Equation Calculator  Breast Cancer Risk " Calculator  BRCA-Related Cancer Risk Assessment: FHS-7 Tool  FRAX Risk Assessment  ICSI Preventive Guidelines  Dietary Guidelines for Americans, 2010  USDA's MyPlate  ASA Prophylaxis  Lung CA Screening    SIMONA Sandoval [facility]

## 2020-11-18 NOTE — PROGRESS NOTES
Answers for HPI/ROS submitted by the patient on 11/17/2020   Annual Exam:  Frequency of exercise:: 2-3 days/week  Getting at least 3 servings of Calcium per day:: Yes  Diet:: Regular (no restrictions)  Taking medications regularly:: Yes  Medication side effects:: None  Bi-annual eye exam:: Yes  Dental care twice a year:: Yes  Sleep apnea or symptoms of sleep apnea:: None  abdominal pain: No  Blood in stool: No  Blood in urine: No  chest pain: No  chills: No  congestion: No  constipation: No  cough: No  diarrhea: No  dizziness: No  ear pain: Yes  eye pain: No  nervous/anxious: No  fever: No  frequency: Yes  genital sores: No  headaches: No  hearing loss: No  heartburn: No  arthralgias: No  joint swelling: No  peripheral edema: No  mood changes: No  myalgias: No  nausea: No  dysuria: No  palpitations: No  Skin sensation changes: No  sore throat: No  urgency: Yes  rash: No  shortness of breath: No  visual disturbance: No  weakness: No  pelvic pain: No  vaginal bleeding: No  vaginal discharge: Yes  tenderness: No  breast mass: No  breast discharge: No  Additional concerns today:: No  Duration of exercise:: 15-30 minutes

## 2020-11-23 LAB
COPATH REPORT: NORMAL
PAP: NORMAL

## 2021-01-15 ENCOUNTER — HEALTH MAINTENANCE LETTER (OUTPATIENT)
Age: 21
End: 2021-01-15

## 2021-03-10 ENCOUNTER — E-VISIT (OUTPATIENT)
Dept: PEDIATRICS | Facility: CLINIC | Age: 21
End: 2021-03-10
Payer: COMMERCIAL

## 2021-03-10 DIAGNOSIS — R30.0 DYSURIA: ICD-10-CM

## 2021-03-10 DIAGNOSIS — B37.31 YEAST INFECTION OF THE VAGINA: Primary | ICD-10-CM

## 2021-03-10 PROCEDURE — 99421 OL DIG E/M SVC 5-10 MIN: CPT | Performed by: PEDIATRICS

## 2021-03-10 RX ORDER — FLUCONAZOLE 150 MG/1
150 TABLET ORAL ONCE
Qty: 1 TABLET | Refills: 0 | Status: SHIPPED | OUTPATIENT
Start: 2021-03-10 | End: 2021-03-10

## 2021-04-15 ENCOUNTER — MYC REFILL (OUTPATIENT)
Dept: PEDIATRICS | Facility: CLINIC | Age: 21
End: 2021-04-15

## 2021-04-15 DIAGNOSIS — G43.719 INTRACTABLE CHRONIC MIGRAINE WITHOUT AURA AND WITHOUT STATUS MIGRAINOSUS: ICD-10-CM

## 2021-04-16 RX ORDER — CYCLOBENZAPRINE HCL 5 MG
5-10 TABLET ORAL 3 TIMES DAILY PRN
Qty: 30 TABLET | Refills: 0 | Status: SHIPPED | OUTPATIENT
Start: 2021-04-16 | End: 2024-02-22

## 2021-04-16 NOTE — TELEPHONE ENCOUNTER
Routing refill request to provider for review/approval because:  Drug not on the [facility] refill protocol     Sailaja Samayoa RN on 4/16/2021 at 11:13 AM

## 2021-05-26 ENCOUNTER — RECORDS - HEALTHEAST (OUTPATIENT)
Dept: ADMINISTRATIVE | Facility: CLINIC | Age: 21
End: 2021-05-26

## 2021-08-03 DIAGNOSIS — G43.719 INTRACTABLE CHRONIC MIGRAINE WITHOUT AURA AND WITHOUT STATUS MIGRAINOSUS: ICD-10-CM

## 2021-08-04 RX ORDER — SUMATRIPTAN 50 MG/1
TABLET, FILM COATED ORAL
Qty: 18 TABLET | Refills: 3 | Status: SHIPPED | OUTPATIENT
Start: 2021-08-04 | End: 2022-10-28

## 2021-08-04 NOTE — TELEPHONE ENCOUNTER
See [facility] message verifying patient does not use 8 or more treatments within 30 days. Prescription approved per [facility] Refill Protocol.  Michael FINLEY RN

## 2021-08-04 NOTE — TELEPHONE ENCOUNTER
Attempted to reach patient, LVMTCB. Need to check if they have used 8 or more treatments in a 30 day period. [employer] message also sent.     Michael FINLEY RN

## 2021-09-04 ENCOUNTER — HEALTH MAINTENANCE LETTER (OUTPATIENT)
Age: 21
End: 2021-09-04

## 2021-09-10 ENCOUNTER — TELEPHONE (OUTPATIENT)
Dept: PEDIATRICS | Facility: CLINIC | Age: 21
End: 2021-09-10

## 2021-09-10 NOTE — TELEPHONE ENCOUNTER
Called and left message for patient requesting a call back.  Sailaja Samayoa RN on 9/10/2021 at 4:04 PM

## 2021-09-10 NOTE — TELEPHONE ENCOUNTER
Sent: 9/9/2021   4:34 PM CDT   To:  [place] [employer] Scheduling Pool   Subject: Appointment Request                                Appointment Request From: Cleo Flores      With Provider: Linda Martin MD [[facility]]      Preferred Date Range: Any      Preferred Times: Any Time      Reason for visit: Request an Appointment      Comments:   I ve been having really sharp pains below my belly button and  to the (my) right side for a couple of days. And it seems to hurt more when I walk/move. But still kind of comes and goes. I was hoping to get an ultraso

## 2021-09-13 NOTE — TELEPHONE ENCOUNTER
Called and left message for patient requesting a call back. Sailaja Samayoa RN on 9/13/2021 at 3:17 PM

## 2021-09-17 NOTE — TELEPHONE ENCOUNTER
Called and left message for patient requesting a call back. 3rd request and closing encounter. Sailaja Samayoa RN on 9/17/2021 at 4:19 PM

## 2021-12-25 ENCOUNTER — HEALTH MAINTENANCE LETTER (OUTPATIENT)
Age: 21
End: 2021-12-25

## 2022-01-19 ENCOUNTER — OFFICE VISIT (OUTPATIENT)
Dept: PEDIATRICS | Facility: CLINIC | Age: 22
End: 2022-01-19
Payer: COMMERCIAL

## 2022-01-19 VITALS
WEIGHT: 228 LBS | OXYGEN SATURATION: 98 % | BODY MASS INDEX: 38.93 KG/M2 | SYSTOLIC BLOOD PRESSURE: 110 MMHG | HEART RATE: 110 BPM | HEIGHT: 64 IN | TEMPERATURE: 97.2 F | DIASTOLIC BLOOD PRESSURE: 70 MMHG | RESPIRATION RATE: 16 BRPM

## 2022-01-19 DIAGNOSIS — E66.01 MORBID OBESITY, UNSPECIFIED OBESITY TYPE (H): ICD-10-CM

## 2022-01-19 DIAGNOSIS — L73.2 HIDRADENITIS SUPPURATIVA: Primary | ICD-10-CM

## 2022-01-19 PROCEDURE — 99214 OFFICE O/P EST MOD 30 MIN: CPT | Performed by: NURSE PRACTITIONER

## 2022-01-19 RX ORDER — DOXYCYCLINE HYCLATE 100 MG
100 TABLET ORAL DAILY
Qty: 90 TABLET | Refills: 0 | Status: SHIPPED | OUTPATIENT
Start: 2022-01-19 | End: 2023-05-09

## 2022-01-19 ASSESSMENT — MIFFLIN-ST. JEOR: SCORE: 1774.2

## 2022-01-19 NOTE — PATIENT INSTRUCTIONS
Patient Education     Understanding Hidradenitis Suppurativa  Hidradenitis suppurativa is a long-term (chronic) skin disease. It causes painful bumps and sores (abscesses) to form around a hair follicle. Follicles are the tiny holes from which hair grows out of your skin. The disease occurs on parts of the body where skin rubs together. It most often appears in the armpits, the groin area, and under the breasts. It's more common in women.    How to say it  [id number]-[id number]  What causes hidradenitis suppurativa?  This skin disease happens when hair follicles become clogged with keratin. Keratin is the protein that makes up your hair and nails. The follicles then burst and become inflamed . Pressure or rubbing on the skin can clog the follicles. Or it can further irritate them.   The disease tends to run in families. It s also more likely to occur in people who are obese, have diabetes, or smoke. Hormones or the immune system may also play a part.   Symptoms of hidradenitis suppurativa  This skin disease causes one or more painful red bumps on the skin. These bumps become inflamed and drain pus. They may also itch or burn. In severe cases, sinus tracts may form. These are narrow channels that run under the skin. Blood or a bad-smelling pus may ooze from these bumps or sinus tracts. Bands of scarring often occur.   Treatment for hidradenitis suppurativa  Treatment for this skin disease is most successful when started early. But it may be hard to diagnose. It may be mistaken for other skin conditions. The painful bumps also often return. So stopping new bumps and limiting scarring is important. Treatment options include:     Warm compress. Putting a warm, wet washcloth on the affected skin may help.    Lifestyle changes. Your symptoms may get better if you lose weight or stop smoking, if needed. Also avoid shaving or other irritants, such as deodorant or perfume.    Antibiotics. For mild cases, an  antibiotic for the skin (topical) may help. You may need oral antibiotics if you have a severe case. They can help prevent further infection.    Other oral medicines. Over-the-counter pain medicines can ease pain and inflammation. You may need stronger medicines for a severe case. These medicines include corticosteroids or a retinoid. These may cause side effects.    Injected medicines. A steroid may be injected into the bump to ease pain. A newer biologic medicine may be injected to ease severe symptoms.    Surgery. Surgery can drain and remove the painful bumps. For severe cases, the doctor may cut out the entire area of affected skin or destroy it with a laser.  Possible complications of hidradenitis suppurativa   These include:    Arthritis    Depression    Lymphedema    Scarring of skin    Skin cancer  When to call your healthcare provider  Call your healthcare provider right away if you have any of these:    Fever of 100.4 F (38 C) or higher, or as directed by your healthcare provider    Redness, swelling, or fluid leaking from your rash that gets worse    Pain that gets worse    Symptoms that don t get better, or get worse    New symptoms  Jaden last reviewed this educational content on 6/1/2019 2000-2021 [employer]. All rights reserved. This information is not intended as a substitute for professional medical care. Always follow your healthcare professional's instructions.

## 2022-01-19 NOTE — PROGRESS NOTES
Assessment & Plan     Hidradenitis suppurativa  Pt with 6 month history of recurrent cysts to her pubic area. History and exam are most consistent with hidradenitis suppurativa. Discussed treatment options including topical antibiotics and oral antibiotics as well as smoking cessation and weight loss. Plan to start oral doxycycline 100 mg daily x 3 months with follow-up virtual visit at that time. She will return to clinic sooner with any concerns or worsening symptoms. Discussed how to take the medication and possible adverse side effects. She can also continue with warm compresses as needed. I have recommended she avoid shaving or waxing her pubic area until symptom improvement is noted. Will consider referral to dermatology if no improvement is noted with oral antibiotics.  - doxycycline hyclate (VIBRA-TABS) 100 MG tablet; Take 1 tablet (100 mg) by mouth daily    Morbid obesity, unspecified obesity type (H)  Body mass index is 39.14 kg/m .      22 minutes spent on the date of the encounter doing chart review, patient visit, documentation and discussion with other provider(s)          MEDICATIONS:        - Start taking doxycycline       - Continue other medications without change  FUTURE APPOINTMENTS:       - Follow-up visit in 3 months    Return in about 3 months (around 4/19/2022) for Follow-up.    SIMONA Ya [facility] JOSE EDUARDO Gallo is a 21 year old who presents for the following health issues     HPI     Presents with skin concern.     Reports 6 month history of cysts to her pubic and groin area. Has one cyst that has been particularly bothersome. She did pop it 1-2 weeks ago which resulted in expression of blood tinged pus. She has stopped waxing and dhaving but symptoms persist. The cysts are quite bothersome to her, often itchy.     She is not sexually active with males, only females so extremely low likelihood of pregnancy.    Patient Active Problem List  "  Diagnosis     Kidney stones     Plantar warts     Tension headache     Intractable chronic migraine without aura     Morbid obesity, unspecified obesity type (H)     Past Medical History:   Diagnosis Date     Intermittent asthma 4/25/2013     Kidney stones 4/25/2013     MRSA cellulitis 2012    hospital stay at [facility]       Current Outpatient Medications   Medication     doxycycline hyclate (VIBRA-TABS) 100 MG tablet     cyclobenzaprine (FLEXERIL) 10 MG tablet     cyclobenzaprine (FLEXERIL) 5 MG tablet     glycopyrrolate (GLYCATE) 2 MG tablet     SUMAtriptan (IMITREX) 50 MG tablet     No current facility-administered medications for this visit.      No Known Allergies    Review of Systems    ROS: 10 point ROS neg other than the symptoms noted above in the HPI.        Objective    /70 (Cuff Size: Adult Large)   Pulse 110   Temp 97.2  F (36.2  C) (Tympanic)   Resp 16   Ht 1.626 m (5' 4\")   Wt 103.4 kg (228 lb)   SpO2 98%   BMI 39.14 kg/m    Body mass index is 39.14 kg/m .  Physical Exam  Constitutional:       General: She is not in acute distress.     Appearance: Normal appearance. She is not ill-appearing or toxic-appearing.   Cardiovascular:      Rate and Rhythm: Normal rate.   Pulmonary:      Effort: Pulmonary effort is normal. No respiratory distress.   Genitourinary:     Comments: Several purple and pink colored superficial cyst-like abscesses noted to mons pubis, no active drainage or crusting appreciated.   Neurological:      General: No focal deficit present.      Mental Status: She is alert and oriented to person, place, and time.   Psychiatric:         Mood and Affect: Mood normal.         Behavior: Behavior normal.                    "

## 2022-02-21 ENCOUNTER — VIRTUAL VISIT (OUTPATIENT)
Dept: URGENT CARE | Facility: CLINIC | Age: 22
End: 2022-02-21
Payer: COMMERCIAL

## 2022-02-21 DIAGNOSIS — R09.81 SINUS CONGESTION: ICD-10-CM

## 2022-02-21 DIAGNOSIS — R68.83 CHILLS: Primary | ICD-10-CM

## 2022-02-21 DIAGNOSIS — R51.9 NONINTRACTABLE HEADACHE, UNSPECIFIED CHRONICITY PATTERN, UNSPECIFIED HEADACHE TYPE: ICD-10-CM

## 2022-02-21 PROCEDURE — 99213 OFFICE O/P EST LOW 20 MIN: CPT | Mod: 95 | Performed by: PHYSICIAN ASSISTANT

## 2022-02-21 NOTE — LETTER
[facility]  [address] [address]  Phone: [phone]    February 21, 2022        Cleo Flores  [address] [address]          To whom it may concern:    RE: Cleo Flores    Patient was seen and treated today at our clinic. She developed fever and chills today and had to leave work. She will need to test for Covid and if that test is positive she will need to self quarantine for at least 5 days from symptom onset and mas for an additional 5 days. If Covid testing is negative she can return to work once she has been without fever without the use of medication for 24 hours.     Please contact me for questions or concerns.      Sincerely,    Vandana Engle PA-C  Virtual Urgent Care

## 2022-02-22 NOTE — PROGRESS NOTES
Cleo is a 21 year old who is being evaluated via a billable video visit.      Video Start Time: 6:37 PM    Assessment & Plan     Chills    Nonintractable headache, unspecified chronicity pattern, unspecified headache type    Sinus congestion    I will have patient take a home covid test and discussed quarantine guidelines if she does test positive. Otherwise I would have her remain home until she has been without fever for 24 hours.     Vandana Engle PA-C  Virtual Urgent Care  [facility]    Subjective   Cleo is a 21 year old who presents for the following health issues fever     HPI - Patient left work today because she developed a headache, chills and some nausea. She has felt febrile all day ans has had sweats and chills. She is not having a cough, is not having a sore throat, is having mild congestion.      Review of Systems   Constitutional, HEENT, cardiovascular, pulmonary, gi and gu systems are negative, except as otherwise noted.      Objective           Vitals:  No vitals were obtained today due to virtual visit.    Physical Exam   GENERAL: alert, no distress and fatigued  EYES: Eyes grossly normal to inspection.  No discharge or erythema, or obvious scleral/conjunctival abnormalities.  RESP: No audible wheeze, cough, or visible cyanosis.  No visible retractions or increased work of breathing.    SKIN: Visible skin clear. No significant rash, abnormal pigmentation or lesions.  NEURO: Cranial nerves grossly intact.  Mentation and speech appropriate for age.  PSYCH: Mentation appears normal, affect normal/bright, judgement and insight intact, normal speech and appearance well-groomed.      Video-Visit Details    Type of service:  Video Visit    Video End Time:6:44 PM    Originating Location (pt. Location): Home    Distant Location (provider location):  [facility]     Platform used for Video Visit: Hootsuite

## 2022-03-08 ENCOUNTER — APPOINTMENT (OUTPATIENT)
Dept: CT IMAGING | Facility: CLINIC | Age: 22
End: 2022-03-08
Attending: EMERGENCY MEDICINE
Payer: COMMERCIAL

## 2022-03-08 ENCOUNTER — HOSPITAL ENCOUNTER (EMERGENCY)
Facility: CLINIC | Age: 22
Discharge: HOME | End: 2022-03-08
Attending: EMERGENCY MEDICINE | Admitting: EMERGENCY MEDICINE
Payer: COMMERCIAL

## 2022-03-08 VITALS
RESPIRATION RATE: 16 BRPM | OXYGEN SATURATION: 98 % | WEIGHT: 200 LBS | SYSTOLIC BLOOD PRESSURE: 131 MMHG | HEART RATE: 82 BPM | TEMPERATURE: 97.9 F | DIASTOLIC BLOOD PRESSURE: 76 MMHG | BODY MASS INDEX: 34.33 KG/M2

## 2022-03-08 DIAGNOSIS — N20.1 URETEROLITHIASIS: ICD-10-CM

## 2022-03-08 DIAGNOSIS — N23 URETERAL COLIC: ICD-10-CM

## 2022-03-08 DIAGNOSIS — N20.1 CALCULUS OF URETER: ICD-10-CM

## 2022-03-08 PROBLEM — J45.990 EXERCISE-INDUCED BRONCHOSPASM: Status: ACTIVE | Noted: 2022-03-08

## 2022-03-08 PROBLEM — A49.02 METHICILLIN RESISTANT STAPHYLOCOCCUS AUREUS INFECTION: Status: ACTIVE | Noted: 2022-03-08

## 2022-03-08 PROBLEM — R10.9 ABDOMINAL PAIN: Status: ACTIVE | Noted: 2022-03-08

## 2022-03-08 PROBLEM — M54.50 LOWER BACK PAIN: Status: ACTIVE | Noted: 2022-03-08

## 2022-03-08 PROBLEM — R51.9 HEADACHE: Status: ACTIVE | Noted: 2022-03-08

## 2022-03-08 PROBLEM — L03.90 CELLULITIS: Status: ACTIVE | Noted: 2022-03-08

## 2022-03-08 LAB
ALBUMIN SERPL-MCNC: 4.2 G/DL (ref 3.5–5)
ALBUMIN UR-MCNC: 50 MG/DL
ALP SERPL-CCNC: 87 U/L (ref 45–120)
ALT SERPL W P-5'-P-CCNC: 23 U/L (ref 0–45)
ANION GAP SERPL CALCULATED.3IONS-SCNC: 12 MMOL/L (ref 5–18)
APPEARANCE UR: ABNORMAL
AST SERPL W P-5'-P-CCNC: 19 U/L (ref 0–40)
BASOPHILS # BLD MANUAL: 0 10E3/UL (ref 0–0.2)
BASOPHILS NFR BLD MANUAL: 0 %
BILIRUB DIRECT SERPL-MCNC: <0.1 MG/DL
BILIRUB SERPL-MCNC: 0.2 MG/DL (ref 0–1)
BILIRUB UR QL STRIP: NEGATIVE
BUN SERPL-MCNC: 17 MG/DL (ref 8–22)
C REACTIVE PROTEIN LHE: 0.4 MG/DL (ref 0–0.8)
CALCIUM SERPL-MCNC: 9.5 MG/DL (ref 8.5–10.5)
CHLORIDE BLD-SCNC: 104 MMOL/L (ref 98–107)
CO2 SERPL-SCNC: 22 MMOL/L (ref 22–31)
COLOR UR AUTO: YELLOW
CREAT SERPL-MCNC: 0.84 MG/DL (ref 0.6–1.1)
EOSINOPHIL # BLD MANUAL: 0.2 10E3/UL (ref 0–0.7)
EOSINOPHIL NFR BLD MANUAL: 1 %
ERYTHROCYTE [DISTWIDTH] IN BLOOD BY AUTOMATED COUNT: 13.7 % (ref 10–15)
GFR SERPL CREATININE-BSD FRML MDRD: >90 ML/MIN/1.73M2
GLUCOSE BLD-MCNC: 107 MG/DL (ref 70–125)
GLUCOSE UR STRIP-MCNC: NEGATIVE MG/DL
HCG SERPL QL: NEGATIVE
HCT VFR BLD AUTO: 38.1 % (ref 35–47)
HGB BLD-MCNC: 12.3 G/DL (ref 11.7–15.7)
HGB UR QL STRIP: ABNORMAL
KETONES UR STRIP-MCNC: ABNORMAL MG/DL
LEUKOCYTE ESTERASE UR QL STRIP: ABNORMAL
LIPASE SERPL-CCNC: 28 U/L (ref 0–52)
LYMPHOCYTES # BLD MANUAL: 7.3 10E3/UL (ref 0.8–5.3)
LYMPHOCYTES NFR BLD MANUAL: 45 %
MCH RBC QN AUTO: 28.4 PG (ref 26.5–33)
MCHC RBC AUTO-ENTMCNC: 32.3 G/DL (ref 31.5–36.5)
MCV RBC AUTO: 88 FL (ref 78–100)
MONOCYTES # BLD MANUAL: 0.5 10E3/UL (ref 0–1.3)
MONOCYTES NFR BLD MANUAL: 3 %
MUCOUS THREADS #/AREA URNS LPF: PRESENT /LPF
NEUTROPHILS # BLD MANUAL: 8.3 10E3/UL (ref 1.6–8.3)
NEUTROPHILS NFR BLD MANUAL: 51 %
NITRATE UR QL: NEGATIVE
PH UR STRIP: 5 [PH] (ref 5–7)
PLAT MORPH BLD: ABNORMAL
PLATELET # BLD AUTO: 467 10E3/UL (ref 150–450)
POTASSIUM BLD-SCNC: 4.5 MMOL/L (ref 3.5–5)
PROT SERPL-MCNC: 7.8 G/DL (ref 6–8)
RBC # BLD AUTO: 4.33 10E6/UL (ref 3.8–5.2)
RBC MORPH BLD: ABNORMAL
RBC URINE: >182 /HPF
SODIUM SERPL-SCNC: 138 MMOL/L (ref 136–145)
SP GR UR STRIP: 1.03 (ref 1–1.03)
UROBILINOGEN UR STRIP-MCNC: <2 MG/DL
VARIANT LYMPHS BLD QL SMEAR: PRESENT
WBC # BLD AUTO: 16.2 10E3/UL (ref 4–11)
WBC URINE: 0 /HPF
YEAST #/AREA URNS HPF: ABNORMAL /HPF

## 2022-03-08 PROCEDURE — 86140 C-REACTIVE PROTEIN: CPT | Performed by: EMERGENCY MEDICINE

## 2022-03-08 PROCEDURE — 99285 EMERGENCY DEPT VISIT HI MDM: CPT | Mod: 25

## 2022-03-08 PROCEDURE — 84703 CHORIONIC GONADOTROPIN ASSAY: CPT | Performed by: EMERGENCY MEDICINE

## 2022-03-08 PROCEDURE — 96375 TX/PRO/DX INJ NEW DRUG ADDON: CPT

## 2022-03-08 PROCEDURE — 258N000003 HC RX IP 258 OP 636: Performed by: EMERGENCY MEDICINE

## 2022-03-08 PROCEDURE — 82248 BILIRUBIN DIRECT: CPT | Performed by: EMERGENCY MEDICINE

## 2022-03-08 PROCEDURE — 96361 HYDRATE IV INFUSION ADD-ON: CPT

## 2022-03-08 PROCEDURE — 82365 CALCULUS SPECTROSCOPY: CPT

## 2022-03-08 PROCEDURE — 74176 CT ABD & PELVIS W/O CONTRAST: CPT

## 2022-03-08 PROCEDURE — 36415 COLL VENOUS BLD VENIPUNCTURE: CPT | Performed by: EMERGENCY MEDICINE

## 2022-03-08 PROCEDURE — 96374 THER/PROPH/DIAG INJ IV PUSH: CPT

## 2022-03-08 PROCEDURE — 87635 SARS-COV-2 COVID-19 AMP PRB: CPT | Performed by: EMERGENCY MEDICINE

## 2022-03-08 PROCEDURE — 250N000011 HC RX IP 250 OP 636: Performed by: EMERGENCY MEDICINE

## 2022-03-08 PROCEDURE — 85027 COMPLETE CBC AUTOMATED: CPT | Performed by: EMERGENCY MEDICINE

## 2022-03-08 PROCEDURE — 250N000013 HC RX MED GY IP 250 OP 250 PS 637: Performed by: EMERGENCY MEDICINE

## 2022-03-08 PROCEDURE — C9803 HOPD COVID-19 SPEC COLLECT: HCPCS

## 2022-03-08 PROCEDURE — 83690 ASSAY OF LIPASE: CPT | Performed by: EMERGENCY MEDICINE

## 2022-03-08 PROCEDURE — 81001 URINALYSIS AUTO W/SCOPE: CPT | Performed by: EMERGENCY MEDICINE

## 2022-03-08 RX ORDER — DIMENHYDRINATE 50 MG
50 TABLET ORAL EVERY 6 HOURS PRN
Qty: 28 TABLET | Refills: 0 | Status: SHIPPED | OUTPATIENT
Start: 2022-03-08 | End: 2022-03-15

## 2022-03-08 RX ORDER — OXYCODONE HYDROCHLORIDE 5 MG/1
5 TABLET ORAL EVERY 6 HOURS PRN
Qty: 10 TABLET | Refills: 0 | Status: SHIPPED | OUTPATIENT
Start: 2022-03-08 | End: 2022-03-11

## 2022-03-08 RX ORDER — ONDANSETRON 2 MG/ML
4 INJECTION INTRAMUSCULAR; INTRAVENOUS ONCE
Status: COMPLETED | OUTPATIENT
Start: 2022-03-08 | End: 2022-03-08

## 2022-03-08 RX ORDER — KETOROLAC TROMETHAMINE 15 MG/ML
15 INJECTION, SOLUTION INTRAMUSCULAR; INTRAVENOUS ONCE
Status: COMPLETED | OUTPATIENT
Start: 2022-03-08 | End: 2022-03-08

## 2022-03-08 RX ORDER — DIMENHYDRINATE 50 MG
50 TABLET ORAL AT BEDTIME
Qty: 7 TABLET | Refills: 0 | Status: SHIPPED | OUTPATIENT
Start: 2022-03-08 | End: 2022-03-15

## 2022-03-08 RX ORDER — ACETAMINOPHEN 325 MG/1
975 TABLET ORAL ONCE
Status: COMPLETED | OUTPATIENT
Start: 2022-03-08 | End: 2022-03-08

## 2022-03-08 RX ORDER — ONDANSETRON 4 MG/1
4 TABLET, ORALLY DISINTEGRATING ORAL EVERY 8 HOURS PRN
Qty: 20 TABLET | Refills: 0 | Status: SHIPPED | OUTPATIENT
Start: 2022-03-08

## 2022-03-08 RX ADMIN — ONDANSETRON 4 MG: 2 INJECTION INTRAMUSCULAR; INTRAVENOUS at 21:50

## 2022-03-08 RX ADMIN — ACETAMINOPHEN 975 MG: 325 TABLET ORAL at 21:53

## 2022-03-08 RX ADMIN — SODIUM CHLORIDE, POTASSIUM CHLORIDE, SODIUM LACTATE AND CALCIUM CHLORIDE 1000 ML: 600; 310; 30; 20 INJECTION, SOLUTION INTRAVENOUS at 21:59

## 2022-03-08 RX ADMIN — KETOROLAC TROMETHAMINE 15 MG: 15 INJECTION, SOLUTION INTRAMUSCULAR; INTRAVENOUS at 21:52

## 2022-03-08 ASSESSMENT — ENCOUNTER SYMPTOMS
FREQUENCY: 1
HEADACHES: 0
DIFFICULTY URINATING: 0
FLANK PAIN: 1
FEVER: 0
COLOR CHANGE: 0
CONFUSION: 0
ABDOMINAL PAIN: 0
NAUSEA: 1
SHORTNESS OF BREATH: 0
ARTHRALGIAS: 0
NECK STIFFNESS: 0
EYE REDNESS: 0

## 2022-03-08 NOTE — Clinical Note
Cleo Flores was seen and treated in our emergency department on 3/8/2022.         Sincerely,     [facility]

## 2022-03-08 NOTE — Clinical Note
Cleo Flores was seen and treated in our emergency department on 3/8/2022.  She may return to work on 03/10/2022.       If you have any questions or concerns, please don't hesitate to call.      Zora Roy MD

## 2022-03-09 ENCOUNTER — TELEPHONE (OUTPATIENT)
Dept: UROLOGY | Facility: CLINIC | Age: 22
End: 2022-03-09
Payer: COMMERCIAL

## 2022-03-09 ENCOUNTER — LAB (OUTPATIENT)
Dept: LAB | Facility: CLINIC | Age: 22
End: 2022-03-09
Payer: COMMERCIAL

## 2022-03-09 DIAGNOSIS — N20.1 CALCULUS OF URETER: Primary | ICD-10-CM

## 2022-03-09 DIAGNOSIS — N20.1 CALCULUS OF URETER: ICD-10-CM

## 2022-03-09 LAB — SARS-COV-2 RNA RESP QL NAA+PROBE: NEGATIVE

## 2022-03-09 NOTE — ED PROVIDER NOTES
"EMERGENCY DEPARTMENT ENCOUNTER      NAME: Cleo Flores  AGE: 21 year old female  YOB: 2000  MRN: [id number]  EVALUATION DATE & TIME: 3/8/2022  9:32 PM    PCP: Linda Martin    ED PROVIDER: Willard Petersen M.D.      Chief Complaint   Patient presents with     Flank Pain       IMPRESSION  1. Ureteral colic        PLAN  - follow up CT, blood & reassess    ED COURSE & MEDICAL DECISION MAKING    ED Course as of 03/08/22 2226   Tue Mar 08, 2022   2201 21yoF with history of kidney stones, obesity presenting for evaluation of right flank pain radiating to her groin. Reports pain onset around 8:40pm tonight while getting of a plane (3-hour flight from [place]); started in groin area \"like I could feel the stone moving\" and then radiated to flank. No chest pain. Mild shortness of breath due to pain but denies pleuritic pain. Mild nausea with no vomiting. Took no meds prior to arrival. Feels like prior kidney stones to her. Was otherwise feeling well earlier today.    SBP 150s on presentation with otherwise normal vitals. Exam with no CVA tenderness, no abdominal tenderness, clear lungs, normal work of breathing, no peripheral edema or calf tenderness, normal neuro exam. Doubt aortic dissection. Doubt PE with no pleuritic pain, no suggestion of DVT, normal vitals, flight was just 3 hours. Most suspect ureteral stone. Will obtain CT, blood, urine while giving IVF, Toradol, Tylenol, Zofran for symptoms. Patient comfortable with this plan; no further questions at this time.   2217 Pregnancy test negative.   2220 UA with hematuria but no UTI. CRP negative as well. Reassuring against infected stone. WBC 16 likely related to pain.   2226 Patient signed out to Dr. Roy at routine shift change. Plan at this time is follow up CT & rest of blood work and reassess.       9:37 PM I met with the patient for the initial interview and physical examination. Discussed plan for treatment and workup in the " ED.      This patient involved a high degree of complexity in medical decision making, as significant risks were present and assessed.    Broad differential considered for this patient presenting, including but not limited to:  Ureteral colic, NANCY, pyelo, sepsis, SBO, perforation, appendicitis, PE, MSK pain    I wore the following PPE during this patient encounter:  N95 mask, face shield w/ eye protection, gloves    MEDICATIONS GIVEN IN THE EMERGENCY DEPARTMENT  Medications   lactated ringers BOLUS 1,000 mL (1,000 mLs Intravenous New Bag 3/8/22 2159)   acetaminophen (TYLENOL) tablet 975 mg (975 mg Oral Given 3/8/22 2153)   ondansetron (ZOFRAN) injection 4 mg (4 mg Intravenous Given 3/8/22 2150)   ketorolac (TORADOL) injection 15 mg (15 mg Intravenous Given 3/8/22 2152)       NEW PRESCRIPTIONS STARTED AT TODAY'S ER VISIT  Current Discharge Medication List      START taking these medications    Details   ondansetron (ZOFRAN ODT) 4 MG ODT tab Take 1 tablet (4 mg) by mouth every 8 hours as needed for nausea  Qty: 20 tablet, Refills: 0      oxyCODONE (ROXICODONE) 5 MG tablet Take 1 tablet (5 mg) by mouth every 6 hours as needed for pain  Qty: 10 tablet, Refills: 0         CONTINUE these medications which have NOT CHANGED    Details   !! cyclobenzaprine (FLEXERIL) 10 MG tablet Take 0.5-1 tablets (5-10 mg) by mouth 3 times daily as needed for muscle spasms  Qty: 30 tablet, Refills: 1    Associated Diagnoses: Tension headache      !! cyclobenzaprine (FLEXERIL) 5 MG tablet Take 1-2 tablets (5-10 mg) by mouth 3 times daily as needed for muscle spasms  Qty: 30 tablet, Refills: 0    Associated Diagnoses: Intractable chronic migraine without aura and without status migrainosus      doxycycline hyclate (VIBRA-TABS) 100 MG tablet Take 1 tablet (100 mg) by mouth daily  Qty: 90 tablet, Refills: 0    Associated Diagnoses: Hidradenitis suppurativa      glycopyrrolate (GLYCATE) 2 MG tablet TK 1/2 TO 1 T PO BID  Refills: 11       SUMAtriptan (IMITREX) 50 MG tablet TAKE 1 TO 2 TABLETS BY MOUTH AT ONSET OF HEADACHE FOR MIGRAINE. MAY REPEAT IN 2 HOURS. MAX OF 4 TABLETS IN 24 HOURS  Qty: 18 tablet, Refills: 3    Associated Diagnoses: Intractable chronic migraine without aura and without status migrainosus       !! - Potential duplicate medications found. Please discuss with provider.        =================================================================      HPI  Patient information was obtained from: Patient    Use of : N/A         Cleo Flores is a 21 year old female with a pertinent history of kidney stones who presents to this ED via walk-in accompanied by a friend for evaluation of right flank pain.     The patient reports getting off of a 3 hour flight this evening when she had onset of urinary frequency and a sharp pain in her right flank. Patient states that she has a history of kidney stones and knew that this was a kidney stone due to the pain. Notes that the pain intermittently radiates to the right side of her abdomen. Patient states that she is breathing more shallow due to the pain. Patient also endorses nausea.       REVIEW OF SYSTEMS   Review of Systems   Constitutional: Negative for fever.   HENT: Negative for congestion.    Eyes: Negative for redness.   Respiratory: Negative for shortness of breath.    Cardiovascular: Negative for chest pain.   Gastrointestinal: Positive for nausea. Negative for abdominal pain.   Genitourinary: Positive for flank pain (right, radiating) and frequency. Negative for difficulty urinating.   Musculoskeletal: Negative for arthralgias and neck stiffness.   Skin: Negative for color change.   Neurological: Negative for headaches.   Psychiatric/Behavioral: Negative for confusion.     All other systems reviewed and are negative except as noted above in HPI.      --------------- MEDICAL HISTORY ---------------  PAST MEDICAL HISTORY:  Past Medical History:   Diagnosis Date     Intermittent  asthma 4/25/2013     Kidney stones 4/25/2013     MRSA cellulitis 2012    hospital stay at [facility]     Patient Active Problem List   Diagnosis     Kidney stones     Plantar warts     Tension headache     Intractable chronic migraine without aura     Morbid obesity, unspecified obesity type (H)     Abdominal pain     Cellulitis     Cellulitis and abscess of upper arm and forearm     Exercise-induced bronchospasm     Headache     Lower back pain     Methicillin resistant Staphylococcus aureus infection       PAST SURGICAL HISTORY:  Past Surgical History:   Procedure Laterality Date     HC REMOVE TONSILS/ADENOIDS,<11 Y/O      Description: Tonsillectomy With Adenoidectomy;  Recorded: 08/19/2012;  Comments: age 7     NO HISTORY OF SURGERY         CURRENT MEDICATIONS:    No current facility-administered medications for this encounter.    Current Outpatient Medications:      ondansetron (ZOFRAN ODT) 4 MG ODT tab, Take 1 tablet (4 mg) by mouth every 8 hours as needed for nausea, Disp: 20 tablet, Rfl: 0     oxyCODONE (ROXICODONE) 5 MG tablet, Take 1 tablet (5 mg) by mouth every 6 hours as needed for pain, Disp: 10 tablet, Rfl: 0     cyclobenzaprine (FLEXERIL) 10 MG tablet, Take 0.5-1 tablets (5-10 mg) by mouth 3 times daily as needed for muscle spasms (Patient not taking: Reported on 11/18/2020), Disp: 30 tablet, Rfl: 1     cyclobenzaprine (FLEXERIL) 5 MG tablet, Take 1-2 tablets (5-10 mg) by mouth 3 times daily as needed for muscle spasms, Disp: 30 tablet, Rfl: 0     doxycycline hyclate (VIBRA-TABS) 100 MG tablet, Take 1 tablet (100 mg) by mouth daily, Disp: 90 tablet, Rfl: 0     glycopyrrolate (GLYCATE) 2 MG tablet, TK 1/2 TO 1 T PO BID, Disp: , Rfl: 11     SUMAtriptan (IMITREX) 50 MG tablet, TAKE 1 TO 2 TABLETS BY MOUTH AT ONSET OF HEADACHE FOR MIGRAINE. MAY REPEAT IN 2 HOURS. MAX OF 4 TABLETS IN 24 HOURS, Disp: 18 tablet, Rfl: 3    ALLERGIES:  No Known Allergies    FAMILY HISTORY:  Family History   Problem Relation Age of  Onset     Family History Negative Mother      Hypertension Mother      Diabetes Mother      Liver Cancer Mother      Liver Disease Mother      Family History Negative Father        SOCIAL HISTORY:   Social History     Socioeconomic History     Marital status: Single     Spouse name: Not on file     Number of children: Not on file     Years of education: Not on file     Highest education level: Not on file   Occupational History     Not on file   Tobacco Use     Smoking status: Former Smoker     Years: 1.00     Smokeless tobacco: Never Used     Tobacco comment: Smokes 1 cigarette a month since May 2016.   Substance and Sexual Activity     Alcohol use: No     Alcohol/week: 0.0 standard drinks     Drug use: No     Sexual activity: Yes     Partners: Female     Birth control/protection: None   Other Topics Concern     Parent/sibling w/ CABG, MI or angioplasty before 65F 55M? No   Social History Narrative     Not on file     Social Determinants of Health     Financial Resource Strain: Not on file   Food Insecurity: Not on file   Transportation Needs: Not on file   Physical Activity: Not on file   Stress: Not on file   Social Connections: Not on file   Intimate Partner Violence: Not on file   Housing Stability: Not on file         --------------- PHYSICAL EXAM ---------------  Nursing notes and vitals reviewed by me.  VITALS:  Vitals:    03/08/22 2129   BP: (!) 155/97   Pulse: 96   Resp: 18   Temp: 97.9  F (36.6  C)   TempSrc: Temporal   SpO2: 97%   Weight: 90.7 kg (200 lb)       PHYSICAL EXAM:    General:  alert, interactive, no distress  Eyes:  conjunctivae clear, conjugate gaze  HENT:  atraumatic, nose with no rhinorrhea, oropharynx clear  Neck:  no meningismus  Cardiovascular:  HR 80's during exam, regular rhythm, no murmurs, brisk cap refill  Chest:  no chest wall tenderness  Pulmonary:  no stridor, normal phonation, normal work of breathing, clear lungs bilaterally  Abdomen:  soft, nondistended, nontender  :  no  CVA tenderness  Back:  no midline spinal tenderness  Musculoskeletal:  no pretibial edema, no calf tenderness. Gross ROM intact to joints of extremities with no obvious deformities.  Skin:  warm, dry, no rash  Neuro:  awake, alert, answers questions appropriately, follows commands, moves all limbs  Psych:  calm, normal affect      --------------- RESULTS ---------------  LAB:  Reviewed and interpreted by me.  Results for orders placed or performed during the hospital encounter of 03/08/22   hCG Qualitative Pregnancy   Result Value Ref Range    hCG Serum Qualitative Negative Negative   CRP inflammation   Result Value Ref Range    CRP 0.4 0.0-<0.8 mg/dL   CBC with platelets and differential   Result Value Ref Range    WBC Count 16.2 (H) 4.0 - 11.0 10e3/uL    RBC Count 4.33 3.80 - 5.20 10e6/uL    Hemoglobin 12.3 11.7 - 15.7 g/dL    Hematocrit 38.1 35.0 - 47.0 %    MCV 88 78 - 100 fL    MCH 28.4 26.5 - 33.0 pg    MCHC 32.3 31.5 - 36.5 g/dL    RDW 13.7 10.0 - 15.0 %    Platelet Count 467 (H) 150 - 450 10e3/uL   UA with Microscopic reflex to Culture    Specimen: Urine, Clean Catch   Result Value Ref Range    Color Urine Yellow Colorless, Straw, Light Yellow, Yellow    Appearance Urine Turbid (A) Clear    Glucose Urine Negative Negative mg/dL    Bilirubin Urine Negative Negative    Ketones Urine Trace (A) Negative mg/dL    Specific Gravity Urine 1.028 1.001 - 1.030    Blood Urine >1.0 mg/dL (A) Negative    pH Urine 5.0 5.0 - 7.0    Protein Albumin Urine 50  (A) Negative mg/dL    Urobilinogen Urine <2.0 <2.0 mg/dL    Nitrite Urine Negative Negative    Leukocyte Esterase Urine 25 Amberly/uL (A) Negative    Budding Yeast Urine Few (A) None Seen /HPF    Mucus Urine Present (A) None Seen /LPF    RBC Urine >182 (H) <=2 /HPF    WBC Urine 0 <=5 /HPF       RADIOLOGY:  CT abdomen/pelvis:  pending at time of patient care handoff        Juliette JJ, am serving as a scribe to document services personally performed by Dr. Lamar  Trinity based on my observation and the provider's statements to me. I, Willard Petersen MD attest that Juliette Ramirez is acting in a scribe capacity, has observed my performance of the services and has documented them in accordance with my direction.      Willard Petersen MD  03/08/22  Emergency Medicine  [facility] EMERGENCY ROOM  [address] [facility] [address] [facility] [address]  [phone]  Dept: [phone]      Willard Petersen MD  03/08/22 2228

## 2022-03-09 NOTE — TELEPHONE ENCOUNTER
Message left for patient to call clinic to set up an appointment for kidney stone follow-up.  Adilene Bains RN

## 2022-03-09 NOTE — DISCHARGE INSTRUCTIONS
For pain control at home, take:  - over-the-counter ibuprofen 800mg by mouth every 8 hours (max dose 2400mg in 24 hours) scheduled for the next 5 days  - over-the-counter acetaminophen 1g by mouth every 6 hours (max dose 4g in 24 hours) scheduled for the next 5 days  - prescribed oxycodone for breakthrough pain    Use the Zofran as needed for any further nausea.    Drink plenty of fluids to stay hydrated.    Call kidney stone clinic tomorrow to arrange follow up with them.    Follow up with your Primary Care provider in 2 days for a recheck.    Return to the Emergency Department for any persistent vomiting, severe worsening, or any other concerns.

## 2022-03-09 NOTE — ED TRIAGE NOTES
"Pt presents to the ED with c/o right flank pain that started around 2040. Pt got off plane and started having the pain. Pt hx of kidney stones and states \"feels similar\". Pain \"takes breath away\".   "

## 2022-03-09 NOTE — ED PROVIDER NOTES
EMERGENCY DEPARTMENT SIGN OUT NOTE        ED COURSE AND MEDICAL DECISION MAKING  Patient was signed out to me by Dr Denilson Petersen at 10:29 PM.    In brief, Cleo Flores is a 21 year old female who initially presented with 1 hour of right flank pain that she reported was similar to when she had kidney stones in the past.     The patient was given Toradol, Tylenol, and Zofran here in the ED for pain management.    At time of sign out, disposition was pending CT imaging and appropriate disposition.    Ct shows cause of her pain is a distal r ureterolithiasis.  No evidence on ua of uti, okay to discharge as she  Notes she feels much more comfortable.  She would like referral to ksi which was made.      11:12 PM rechecked and updated the patient on the results of the CT scan. We discussed the plan for discharge and the patient is agreeable. Reviewed supportive cares, symptomatic treatment, outpatient follow up, and reasons to return to the Emergency Department. Patient to be discharged by ED RN.    FINAL IMPRESSION    1. Ureteral colic    2. Calculus of ureter    3. Ureterolithiasis        ED MEDS  Medications   lactated ringers BOLUS 1,000 mL (1,000 mLs Intravenous New Bag 3/8/22 2159)   acetaminophen (TYLENOL) tablet 975 mg (975 mg Oral Given 3/8/22 2153)   ondansetron (ZOFRAN) injection 4 mg (4 mg Intravenous Given 3/8/22 2150)   ketorolac (TORADOL) injection 15 mg (15 mg Intravenous Given 3/8/22 2152)       LAB  Labs Ordered and Resulted from Time of ED Arrival to Time of ED Departure   CBC WITH PLATELETS AND DIFFERENTIAL - Abnormal       Result Value    WBC Count 16.2 (*)     RBC Count 4.33      Hemoglobin 12.3      Hematocrit 38.1      MCV 88      MCH 28.4      MCHC 32.3      RDW 13.7      Platelet Count 467 (*)    ROUTINE UA WITH MICROSCOPIC REFLEX TO CULTURE - Abnormal    Color Urine Yellow      Appearance Urine Turbid (*)     Glucose Urine Negative      Bilirubin Urine Negative      Ketones Urine Trace (*)      Specific Gravity Urine 1.028      Blood Urine >1.0 mg/dL (*)     pH Urine 5.0      Protein Albumin Urine 50  (*)     Urobilinogen Urine <2.0      Nitrite Urine Negative      Leukocyte Esterase Urine 25 Amberly/uL (*)     Budding Yeast Urine Few (*)     Mucus Urine Present (*)     RBC Urine >182 (*)     WBC Urine 0     DIFFERENTIAL - Abnormal    % Neutrophils 51      % Lymphocytes 45      % Monocytes 3      % Eosinophils 1      % Basophils 0      Absolute Neutrophils 8.3      Absolute Lymphocytes 7.3 (*)     Absolute Monocytes 0.5      Absolute Eosinophils 0.2      Absolute Basophils 0.0      RBC Morphology Confirmed RBC Indices      Platelet Assessment        Value: Automated Count Confirmed. Platelet morphology is normal.    Reactive Lymphocytes Present (*)    HCG QUALITATIVE PREGNANCY - Normal    hCG Serum Qualitative Negative     CRP INFLAMMATION - Normal    CRP 0.4     LIPASE - Normal    Lipase 28     HEPATIC FUNCTION PANEL - Normal    Bilirubin Total 0.2      Bilirubin Direct <0.1      Protein Total 7.8      Albumin 4.2      Alkaline Phosphatase 87      AST 19      ALT 23     BASIC METABOLIC PANEL - Normal    Sodium 138      Potassium 4.5      Chloride 104      Carbon Dioxide (CO2) 22      Anion Gap 12      Urea Nitrogen 17      Creatinine 0.84      Calcium 9.5      Glucose 107      GFR Estimate >90     COVID-19 VIRUS (CORONAVIRUS) BY PCR       RADIOLOGY    CT Abdomen Pelvis w/o Contrast   Final Result   IMPRESSION:    1.  2.6 mm obstructive stone at the right UVJ. Nonobstructive left nephrolithiasis.             DISCHARGE MEDS  New Prescriptions    DIMENHYDRINATE (DRAMAMINE) 50 MG TABLET    Take 1 tablet (50 mg) by mouth At Bedtime for 7 days    DIMENHYDRINATE (DRAMAMINE) 50 MG TABLET    Take 1 tablet (50 mg) by mouth every 6 hours as needed for other (kidney stone pain management)    ONDANSETRON (ZOFRAN ODT) 4 MG ODT TAB    Take 1 tablet (4 mg) by mouth every 8 hours as needed for nausea    OXYCODONE (ROXICODONE)  5 MG TABLET    Take 1 tablet (5 mg) by mouth every 6 hours as needed for pain       I, Nico Mar, am serving as a scribe to document services personally performed by Zora Roy M.D. based on my observations and the provider's statements to me.  I, Zora Roy M.D., attest that Nico Mar is acting in a scribe capacity, has observed my performance of the services and has documented them in accordance with my direction.       Zora Roy MD  Emergency Medicine  [facility] EMERGENCY ROOM     Zora Roy MD  03/08/22 [id number]

## 2022-03-09 NOTE — ED NOTES
"Pt states \"I passed the stone\"  Patient holding a urine cup with stone inside, instructed to follow up with urology   Denies pain and nausea at this time  Questions and concerns answered prior to D/C  "

## 2022-03-14 LAB
APPEARANCE STONE: NORMAL
COMPN STONE: NORMAL
SPECIMEN WT: 20 MG

## 2022-06-23 ENCOUNTER — E-VISIT (OUTPATIENT)
Dept: PEDIATRICS | Facility: CLINIC | Age: 22
End: 2022-06-23
Payer: COMMERCIAL

## 2022-06-23 DIAGNOSIS — J30.1 ALLERGIC RHINITIS DUE TO POLLEN, UNSPECIFIED SEASONALITY: Primary | ICD-10-CM

## 2022-06-23 PROCEDURE — 99421 OL DIG E/M SVC 5-10 MIN: CPT | Performed by: PEDIATRICS

## 2022-06-23 RX ORDER — PREDNISONE 20 MG/1
40 TABLET ORAL DAILY
Qty: 6 TABLET | Refills: 0 | Status: SHIPPED | OUTPATIENT
Start: 2022-06-23 | End: 2022-06-26

## 2022-06-23 RX ORDER — MONTELUKAST SODIUM 10 MG/1
10 TABLET ORAL AT BEDTIME
Qty: 30 TABLET | Refills: 11 | Status: SHIPPED | OUTPATIENT
Start: 2022-06-23 | End: 2024-02-22

## 2022-08-03 ENCOUNTER — TELEPHONE (OUTPATIENT)
Dept: PEDIATRICS | Facility: CLINIC | Age: 22
End: 2022-08-03

## 2022-08-03 NOTE — LETTER
October 11, 2022      Cleo Flores  [address] [address]        Dear Cleo,       We care about your health and have reviewed your health plan including your medical conditions, medications, and lab results.  Based on this review, it is recommended that you follow up regarding the following health topic(s):  -Asthma    We recommend you take the following action(s):   -Complete and return the attached ASTHMA CONTROL TEST.  If your total score is 19 or less or you have been to the ER or urgent care for your asthma, then please schedule an asthma followup appointment.     Please call us at the [facility] - [phone] (or use [employer]) to address the above recommendations.     Thank you for trusting [facility] and we appreciate the opportunity to serve you.  We look forward to supporting your healthcare needs in the future.    Healthy Regards,    Your Health Care Team  [facility]

## 2022-08-03 NOTE — TELEPHONE ENCOUNTER
Patient Quality Outreach    Patient is due for the following:   Asthma  -  ACT needed    Next Steps:   No follow up needed at this time.    Type of outreach:    Sent [employer] message.    Next Steps:  Reach out within 90 days via Phone.    Max number of attempts reached: No. Will try again in 90 days if patient still on fail list.    Questions for provider review:    None     Federica Mcgowan CMA

## 2022-09-06 NOTE — TELEPHONE ENCOUNTER
Patient Quality Outreach    Patient is due for the following:   Asthma  -  ACT needed    Next Steps:   No follow up needed at this time.    Type of outreach:    Phone, left message for patient/parent to call back.    Next Steps:  Reach out within 90 days via Letter.    Max number of attempts reached: No. Will try again in 90 days if patient still on fail list.    Questions for provider review:    None     Federica Mcgowan, CMA

## 2022-10-11 NOTE — TELEPHONE ENCOUNTER
Patient Quality Outreach    Patient is due for the following:   Asthma  -  ACT needed    Next Steps:   No follow up needed at this time.    Type of outreach:    Sent letter. and Copy of ACT mailed to patient.      Questions for provider review:    None     Federica Mcgowan CMA

## 2022-10-22 ENCOUNTER — HEALTH MAINTENANCE LETTER (OUTPATIENT)
Age: 22
End: 2022-10-22

## 2022-10-28 DIAGNOSIS — G43.719 INTRACTABLE CHRONIC MIGRAINE WITHOUT AURA AND WITHOUT STATUS MIGRAINOSUS: ICD-10-CM

## 2022-10-28 RX ORDER — SUMATRIPTAN 50 MG/1
TABLET, FILM COATED ORAL
Qty: 18 TABLET | Refills: 0 | Status: SHIPPED | OUTPATIENT
Start: 2022-10-28 | End: 2023-11-03

## 2022-10-28 NOTE — TELEPHONE ENCOUNTER
Pt calls, see last refill, #18 is a 3 month supply and lasted until August 2022, pt is using 6 or less per month, now moved to [place], estrella refill sent, pt will confirm new insurance and follow up with new PCP if staying in [place]  Prescription approved per [facility] Refill Protocol.  Debbi Crook RN, BSN  [facility]

## 2022-10-28 NOTE — TELEPHONE ENCOUNTER
Attempted to reach patient, LVMTCB. Need to check if they have used 8 or more treatments in a 30 day period.     Marika SAHNI RN, BSN     Never smoker

## 2023-02-02 ENCOUNTER — HOSPITAL ENCOUNTER (EMERGENCY)
Facility: CLINIC | Age: 23
Discharge: HOME | End: 2023-02-02
Attending: EMERGENCY MEDICINE | Admitting: EMERGENCY MEDICINE
Payer: COMMERCIAL

## 2023-02-02 VITALS
DIASTOLIC BLOOD PRESSURE: 89 MMHG | WEIGHT: 240 LBS | SYSTOLIC BLOOD PRESSURE: 156 MMHG | TEMPERATURE: 98.2 F | OXYGEN SATURATION: 96 % | BODY MASS INDEX: 41.2 KG/M2 | RESPIRATION RATE: 18 BRPM | HEART RATE: 87 BPM

## 2023-02-02 DIAGNOSIS — U07.1 INFECTION DUE TO 2019 NOVEL CORONAVIRUS: ICD-10-CM

## 2023-02-02 LAB
FLUAV RNA SPEC QL NAA+PROBE: NEGATIVE
FLUBV RNA RESP QL NAA+PROBE: NEGATIVE
RSV RNA SPEC NAA+PROBE: NEGATIVE
SARS-COV-2 RNA RESP QL NAA+PROBE: POSITIVE

## 2023-02-02 PROCEDURE — 99283 EMERGENCY DEPT VISIT LOW MDM: CPT | Mod: CS

## 2023-02-02 PROCEDURE — 87637 SARSCOV2&INF A&B&RSV AMP PRB: CPT | Performed by: EMERGENCY MEDICINE

## 2023-02-02 PROCEDURE — C9803 HOPD COVID-19 SPEC COLLECT: HCPCS

## 2023-02-02 RX ORDER — LIDOCAINE HYDROCHLORIDE 20 MG/ML
15 SOLUTION OROPHARYNGEAL
Qty: 100 ML | Refills: 0 | Status: SHIPPED | OUTPATIENT
Start: 2023-02-02 | End: 2024-02-22

## 2023-02-02 ASSESSMENT — ACTIVITIES OF DAILY LIVING (ADL): ADLS_ACUITY_SCORE: 33

## 2023-02-02 NOTE — ED PROVIDER NOTES
EMERGENCY DEPARTMENT ENCOUNTER      NAME: Cleo Flores  AGE: 22 year old female  YOB: 2000  MRN: [id number]  EVALUATION DATE & TIME: 2/2/2023  6:59 AM    PCP: Linda Martin    ED PROVIDER: Marleny Ryan MD      Chief Complaint   Patient presents with     Pharyngitis     Covid Concern         FINAL IMPRESSION:  No diagnosis found.      ED COURSE & MEDICAL DECISION MAKING:    Pertinent Labs & Imaging studies reviewed. (See chart for details)  22 year old female presents to the Emergency Department for evaluation of sore throat upper respiratory symptoms concerning for COVID.  On exam, patient did not desat with laying back or with pacing or increased activity in the room.  Also her throat is concerning, exam does not have findings concerning for sore throat.  Given the more diffuse picture of an upper respiratory infection, far more likely to be a URI or COVID as source of sore throat.  Test results show been positive.  Advised patient on quarantine precautions and symptomatic management of symptoms until she feels better.  Advised her on when to come back to the emergency department.  Stressed understanding and was sent home with a prescription for viscous lidocaine as well as very symptomatic management recommendations.         At the conclusion of the encounter I discussed the results of all of the tests and the disposition. The questions were answered. The patient or family acknowledged understanding and was agreeable with the care plan.     0 minutes of critical care time     MEDICATIONS GIVEN IN THE EMERGENCY:  Medications - No data to display    NEW PRESCRIPTIONS STARTED AT TODAY'S ER VISIT  New Prescriptions    No medications on file          =================================================================    HPI    Patient information was obtained from: Patient    Use of : N/A         Cleo Flores is a 22 year old female with a pertinent history of migraine headaches  and low back pain who presents to this ED by self transport for evaluation of URI symptoms and shortness of breath concerning for COVID.    Patient was visiting her grandparents in [place] last week, she later found out that they were sick with COVID but did not tell her or her family.  She reports feeling sore throat starting Monday describes the pain as shooting and stabbing, painful enough recently that she has not been able to swallow her spit.  Then progressed to headache, greenish mucus coughing up, feeling feverish without running a temperature, sweats, myalgias in the neck and shoulders, and mild shortness of breath when laying down to sleep tonight.  Patient has no difficulty breathing when she sits up, but when lying down feels like she is choking on her spit.  Patient reports some soreness in lower extremities, but no swelling noted.    She received her base COVID-vaccine series, but has not received any boosters.  Has not received flu shot this year.        REVIEW OF SYSTEMS   Review of Systems   See HPI    PAST MEDICAL HISTORY:  Past Medical History:   Diagnosis Date     Intermittent asthma 4/25/2013     Kidney stones 4/25/2013     MRSA cellulitis 2012    hospital stay at [facility]       PAST SURGICAL HISTORY:  Past Surgical History:   Procedure Laterality Date     HC REMOVE TONSILS/ADENOIDS,<9 Y/O      Description: Tonsillectomy With Adenoidectomy;  Recorded: 08/19/2012;  Comments: age 7     NO HISTORY OF SURGERY             CURRENT MEDICATIONS:    cyclobenzaprine (FLEXERIL) 10 MG tablet  cyclobenzaprine (FLEXERIL) 5 MG tablet  doxycycline hyclate (VIBRA-TABS) 100 MG tablet  glycopyrrolate (GLYCATE) 2 MG tablet  montelukast (SINGULAIR) 10 MG tablet  ondansetron (ZOFRAN ODT) 4 MG ODT tab  SUMAtriptan (IMITREX) 50 MG tablet        ALLERGIES:  No Known Allergies    FAMILY HISTORY:  Family History   Problem Relation Age of Onset     Family History Negative Mother      Hypertension Mother      Diabetes Mother       Liver Cancer Mother      Liver Disease Mother      Family History Negative Father        SOCIAL HISTORY:   Social History     Socioeconomic History     Marital status: Single   Tobacco Use     Smoking status: Former     Years: 1.00     Types: Cigarettes     Smokeless tobacco: Never     Tobacco comments:     Smokes 1 cigarette a month since May 2016.   Substance and Sexual Activity     Alcohol use: No     Alcohol/week: 0.0 standard drinks     Drug use: No     Sexual activity: Yes     Partners: Female     Birth control/protection: None   Other Topics Concern     Parent/sibling w/ CABG, MI or angioplasty before 65F 55M? No       VITALS:  BP (!) 156/89   Pulse 87   Temp 98.2  F (36.8  C) (Oral)   Resp 18   Wt 108.9 kg (240 lb)   SpO2 96%   BMI 41.20 kg/m      PHYSICAL EXAM    Constitutional: Well developed, Well nourished, NAD, GCS 15   HENT: Normocephalic, Atraumatic, Bilateral external ears normal, Oropharynx normal, mucous membranes moist, no tonsillar erythema or exudates, nose normal.   Neck-  Normal range of motion, No tenderness, Supple, No stridor.   Eyes: PERRL, EOMI, Conjunctiva normal, No discharge.   Respiratory: Normal breath sounds, No respiratory distress, No wheezing, Speaks full sentences easily. No desaturations with laying on back or with activity in room. Wet cough.   Cardiovascular: Normal heart rate, Regular rhythm,  No murmurs, No rubs, No gallops. Chest wall nontender.   GI: No excessive obesity. Bowel sounds normal, Soft, No tenderness, No masses, No flank tenderness. No rebound or guarding.   Musculoskeletal: 2+ DP pulses. No edema.  No tenderness to palpation of lower extremities.  No cyanosis, No clubbing. Good range of motion in all major joints.    Integument: Warm, Dry, No erythema, No rash. No petechiae.  Lymphatic: Mild peritonsillar lymphadenopathy  Neurologic: Alert & oriented x 3, Normal motor function, Normal sensory function, No focal deficits noted. Normal gait.    Psychiatric: Affect normal, Judgment normal, Mood normal. Cooperative.      LAB:  All pertinent labs reviewed and interpreted.       RADIOLOGY:  Reviewed all pertinent imaging. Please see official radiology report.  No orders to display       EKG:    None    PROCEDURES:   None      [facility] Documentation:   CMS Diagnoses:               Marleny Ryan MD   PGY-2 Family Medicine Resident  [facility] EMERGENCY ROOM  [address] [place] [id number] [phone]     Marleny Ryan MD  Resident  02/02/23 7384

## 2023-02-02 NOTE — ED TRIAGE NOTES
Patient here for sore throat, headache, and mild shortness of breath   Was exposed to a person infected with covid  No signs of respiratory distress, 96% on room air     Triage Assessment     Row Name 02/02/23 0653       Triage Assessment (Adult)    Airway WDL WDL       Respiratory WDL    Respiratory WDL WDL;rhythm/pattern    Rhythm/Pattern, Respiratory shortness of breath       Skin Circulation/Temperature WDL    Skin Circulation/Temperature WDL WDL       Cardiac WDL    Cardiac WDL WDL       Peripheral/Neurovascular WDL    Peripheral Neurovascular WDL WDL       Cognitive/Neuro/Behavioral WDL    Cognitive/Neuro/Behavioral WDL WDL

## 2023-02-02 NOTE — Clinical Note
Cleo Flores was seen and treated in our emergency department on 2/2/2023.  She may return to work on 02/06/2023.       If you have any questions or concerns, please don't hesitate to call.      Marleny Ryan MD

## 2023-02-02 NOTE — ED PROVIDER NOTES
I am seeing this patient along with Marleny Ryan MD, [facility] Practice Resident. I had a face to face encounter with this patient seen by the Advanced Practice Provider (AVILA).  I have seen, examined, and discussed the patient with the AVILA and agree with their assessment and plan of management. I personally saw the patient and performed a substantive portion of the visit including all aspects of the medical decision making.    HPI:  The patient reports she recently returned from [place] where she was visiting her grandparents who she later found out were sick. Over the past couple days she has developed a sore throat, headache, runny nose, and productive cough. When she lies flat at night she begins to choke on her postnasal drip causing her to have some mild shortness of breath. Last night she felt feverish, had night sweats, and had muscle aches in her neck and shoulders. She endorses mild nausea, but has not had any vomiting episodes. The patient has been vaccinated against COVID (base vaccines only, no boosters), but has not been vaccinated against for influenza this year.    ROS:  Positive for sore throat, headache, rhinorrhea, productive cough, fever, diaphoresis, myalgias, nausea. Negative for vomiting.    Physical Exam: normal vitals, nontoxic      LABS  Pertinent lab results reviewed in chart.  Labs Ordered and Resulted from Time of ED Arrival to Time of ED Departure   INFLUENZA A/B & SARS-COV2 PCR MULTIPLEX - Abnormal       Result Value    Influenza A PCR Negative      Influenza B PCR Negative      RSV PCR Negative      SARS CoV2 PCR Positive (*)        EKG      RADIOLOGY  No orders to display       PROCEDURES       ED COURSE & MEDICAL DECISION MAKING    Pertinent Labs and Imagaing reviewed (see chart for details)    22 year old female with URI symptoms.  Patient was exposed to ill people and was traveling.  Suspect COVID versus influenza versus other viral URI.  No significant comorbid conditions  that would require Paxil for treatment.  Positive for COVID here in the ED.  Will discharge with home isolation and return precautions.    At the conclusion of the encounter I discussed  the results of all of the tests and the disposition.   The questions were answered.  The patient or family acknowledged understanding and was agreeable with the care plan.     7:25 AM Marleny Ryan MD, [facility] Resident, staffed the patient with me. We discussed the patient's presentation and plan for care. I will personally see and examine the patient.  I met with the patient to obtain patient history and perform my physical exam.    Medical Decision Making    History:    Supplemental history from: Documented in chart, if applicable    External Record(s) reviewed: Documented in chart, if applicable. and Outpatient Record: history    Work Up:    Chart documentation includes differential considered and any EKGs or imaging independently interpreted by provider, where specified.    In additional to work up documented, I considered the following work up: Documented in chart, if applicable. and Other: strep testing, but deferred as no centor criteria    External consultation:    Discussion of management with another provider: Documented in chart, if applicable    Complicating factors:    Care impacted by chronic illness: Other: obesity    Care affected by social determinants of health: N/A    Disposition considerations: Discharge. No recommendations on prescription strength medication(s). I considered admission, but ultimately discharged patient with reassuring vitals.          FINAL IMPRESSION      1. Infection due to 2019 novel coronavirus                INico, am serving as a scribe to document services personally performed by Brielle Reyna M.D. based on my observations and the provider's statements to me.  IBrielle M.D., attest that Nico Mar is acting in a scribe capacity, has observed my  performance of the services and has documented them in accordance with my direction.    Brielle Reyna M.D.  2/2/2023 7:26 AM     Brielle Reyna MD  02/02/23 0755

## 2023-04-01 ENCOUNTER — HEALTH MAINTENANCE LETTER (OUTPATIENT)
Age: 23
End: 2023-04-01

## 2023-04-03 ENCOUNTER — OFFICE VISIT (OUTPATIENT)
Dept: URGENT CARE | Facility: URGENT CARE | Age: 23
End: 2023-04-03
Payer: COMMERCIAL

## 2023-04-03 ENCOUNTER — ANCILLARY PROCEDURE (OUTPATIENT)
Dept: GENERAL RADIOLOGY | Facility: CLINIC | Age: 23
End: 2023-04-03
Attending: PHYSICIAN ASSISTANT
Payer: COMMERCIAL

## 2023-04-03 VITALS
TEMPERATURE: 98.3 F | BODY MASS INDEX: 40.82 KG/M2 | HEART RATE: 94 BPM | DIASTOLIC BLOOD PRESSURE: 85 MMHG | WEIGHT: 237.8 LBS | OXYGEN SATURATION: 95 % | RESPIRATION RATE: 16 BRPM | SYSTOLIC BLOOD PRESSURE: 121 MMHG

## 2023-04-03 DIAGNOSIS — R05.1 ACUTE COUGH: ICD-10-CM

## 2023-04-03 DIAGNOSIS — Z72.0 VAPES NICOTINE CONTAINING SUBSTANCE: ICD-10-CM

## 2023-04-03 DIAGNOSIS — J30.2 SEASONAL ALLERGIC RHINITIS, UNSPECIFIED TRIGGER: ICD-10-CM

## 2023-04-03 DIAGNOSIS — J45.21 MILD INTERMITTENT ASTHMA WITH EXACERBATION: Primary | ICD-10-CM

## 2023-04-03 PROCEDURE — 99214 OFFICE O/P EST MOD 30 MIN: CPT | Mod: 25 | Performed by: PHYSICIAN ASSISTANT

## 2023-04-03 PROCEDURE — 94640 AIRWAY INHALATION TREATMENT: CPT | Performed by: PHYSICIAN ASSISTANT

## 2023-04-03 PROCEDURE — 71046 X-RAY EXAM CHEST 2 VIEWS: CPT | Mod: TC | Performed by: RADIOLOGY

## 2023-04-03 RX ORDER — ALBUTEROL SULFATE 90 UG/1
2 AEROSOL, METERED RESPIRATORY (INHALATION) EVERY 4 HOURS PRN
Qty: 18 G | Refills: 0 | Status: SHIPPED | OUTPATIENT
Start: 2023-04-03 | End: 2024-02-22

## 2023-04-03 RX ORDER — FLUTICASONE PROPIONATE 50 MCG
1 SPRAY, SUSPENSION (ML) NASAL DAILY
Qty: 16 G | Refills: 0 | Status: SHIPPED | OUTPATIENT
Start: 2023-04-03

## 2023-04-03 RX ORDER — PREDNISONE 20 MG/1
40 TABLET ORAL DAILY
Qty: 10 TABLET | Refills: 0 | Status: SHIPPED | OUTPATIENT
Start: 2023-04-03 | End: 2023-04-08

## 2023-04-03 RX ORDER — IPRATROPIUM BROMIDE AND ALBUTEROL SULFATE 2.5; .5 MG/3ML; MG/3ML
3 SOLUTION RESPIRATORY (INHALATION) ONCE
Status: COMPLETED | OUTPATIENT
Start: 2023-04-03 | End: 2023-04-03

## 2023-04-03 RX ADMIN — IPRATROPIUM BROMIDE AND ALBUTEROL SULFATE 3 ML: 2.5; .5 SOLUTION RESPIRATORY (INHALATION) at 13:00

## 2023-04-03 ASSESSMENT — ENCOUNTER SYMPTOMS
SHORTNESS OF BREATH: 1
DIAPHORESIS: 1
COUGH: 1
FEVER: 0
RHINORRHEA: 1
CHILLS: 1
CHEST TIGHTNESS: 1
VOMITING: 0
SORE THROAT: 0
DIARRHEA: 0
NAUSEA: 1

## 2023-04-03 NOTE — PROGRESS NOTES
Assessment & Plan:        ICD-10-CM    1. Mild intermittent asthma with exacerbation  J45.21 ipratropium - albuterol 0.5 mg/2.5 mg/3 mL (DUONEB) neb solution 3 mL     albuterol (PROAIR HFA/PROVENTIL HFA/VENTOLIN HFA) 108 (90 Base) MCG/ACT inhaler     predniSONE (DELTASONE) 20 MG tablet      2. Acute cough  R05.1 XR Chest 2 Views      3. Seasonal allergic rhinitis, unspecified trigger  J30.2 fluticasone (FLONASE) 50 MCG/ACT nasal spray      4. Vapes nicotine containing substance  Z72.0             Plan/Clinical Decision Making:    Patient with acute Viral URI and cough with increasing SOB and wheezing.   On exam rhonchi, wheezing. Duoneb done, improved breathing, mild wheezing persistent.   Will start on albuterol inhaler and course of prednisone.   CXR done I independently visualized the xray: no infiltrate seen.     Patient also has seasonal allergies and suspect also triggering asthma symptoms.   Discussed starting Flonase and OTC antihistamine.     Patient also vapes. She is very motivated to quit and plans to quit right away.     Return if symptoms worsen or fail to improve, for in 3-5 days.     At the end of the encounter, I discussed results, diagnosis, medications. Discussed red flags for immediate return to clinic/ER, as well as indications for follow up if no improvement. Patient understood and agreed to plan. Patient was stable for discharge.        Priscilla Mendoza PA-C on 4/3/2023 at 12:41 PM          Subjective:     HPI:    Cleo is a 22 year old female who presents to clinic today for the following health issues:  Chief Complaint   Patient presents with     URI     Start 3/30/23 sx productive cough w/green yellow flem, chest and nasal congestion, chest tightness causes coughing attacks, can't lay down feels like lungs get flooded, when breathing hears a bubbling or crackling sound, runny nose, bilateral ears are plugged, fever- 99.6 (oral) tx: Dayquil, Nyquil, steam tx throat lozenges       HPI    Developed illness on Thursday. Took Two covid tests and negative. Had runny nose and ST.   Having chest tightness and increased SOB with activity. Now nasal symptoms are improving.   Temp: 99.6, having some chills/sweats.     SH: vaping    PMH: covid 2/2/23    History obtained from the patient.    Review of Systems   Constitutional: Positive for chills and diaphoresis. Negative for fever.   HENT: Positive for congestion (mild now) and rhinorrhea (mild now. ). Negative for sore throat.    Respiratory: Positive for cough, chest tightness and shortness of breath.    Gastrointestinal: Positive for nausea. Negative for diarrhea and vomiting.         Patient Active Problem List   Diagnosis     Kidney stones     Mild intermittent asthma with exacerbation     Plantar warts     Tension headache     Intractable chronic migraine without aura     Morbid obesity, unspecified obesity type (H)     Abdominal pain     Cellulitis     Cellulitis and abscess of upper arm and forearm     Exercise-induced bronchospasm     Headache     Lower back pain     Methicillin resistant Staphylococcus aureus infection     Seasonal allergic rhinitis, unspecified trigger        Past Medical History:   Diagnosis Date     Intermittent asthma 4/25/2013     Kidney stones 4/25/2013     MRSA cellulitis 2012    hospital stay at [facility]       Social History     Tobacco Use     Smoking status: Former     Years: 1.00     Types: Cigarettes, Vaping Device     Smokeless tobacco: Never     Tobacco comments:     Smokes 1 cigarette a month since May 2016. Vaping nicotine intermittently as of 2/2/23.    Vaping Use     Vaping status: Every Day     Substances: Nicotine     Devices: Disposable   Substance Use Topics     Alcohol use: No     Alcohol/week: 0.0 standard drinks of alcohol             Objective:     Vitals:    04/03/23 1225   BP: 121/85   Pulse: 94   Resp: 16   Temp: 98.3  F (36.8  C)   SpO2: 95%   Weight: 107.9 kg (237 lb 12.8 oz)         Physical  Exam   EXAM:   Pleasant, alert, appropriate appearance. NAD.  Head Exam: Normocephalic, atraumatic.  Eye Exam:   non icteric/injection.    Ear Exam: TMs grey without bulging. Normal canals.  Normal pinna.  Nose Exam: Normal external nose.    OroPharynx Exam:  Moist mucous membranes. No erythema, pharynx without exudate or hypertrophy.  Neck/Thyroid Exam:  No LAD.    Chest/Respiratory Exam: Bilateral wheezing, mild rhonchi  Cardiovascular Exam: RRR. No murmur or rubs.      Results:  Results for orders placed or performed in visit on 04/03/23   XR Chest 2 Views     Status: None    Narrative    CHEST TWO VIEWS 4/3/2023 12:59 PM     HISTORY: Acute cough    COMPARISON: None.       Impression    IMPRESSION: There are no acute infiltrates. The cardiac silhouette is  not enlarged. Pulmonary vasculature is unremarkable.    BEREKET GUTIÉRREZ MD         SYSTEM ID:  [id number]

## 2023-04-04 PROBLEM — J30.2 SEASONAL ALLERGIC RHINITIS, UNSPECIFIED TRIGGER: Status: ACTIVE | Noted: 2023-04-04

## 2023-05-09 ENCOUNTER — E-VISIT (OUTPATIENT)
Dept: PEDIATRICS | Facility: CLINIC | Age: 23
End: 2023-05-09
Payer: COMMERCIAL

## 2023-05-09 DIAGNOSIS — B37.31 YEAST INFECTION OF THE VAGINA: Primary | ICD-10-CM

## 2023-05-09 PROCEDURE — 99421 OL DIG E/M SVC 5-10 MIN: CPT | Performed by: PEDIATRICS

## 2023-05-09 RX ORDER — FLUCONAZOLE 150 MG/1
150 TABLET ORAL ONCE
Qty: 1 TABLET | Refills: 0 | Status: SHIPPED | OUTPATIENT
Start: 2023-05-09 | End: 2023-05-09

## 2023-05-09 NOTE — PATIENT INSTRUCTIONS
Cleo,    Thanks for writing in - I sent a prescription for diflucan to your pharmacy to help with a yeast infection.    In a separate note below, you'll see instructions on how to come in for a vaginal swab to make sure yeast is what we are dealing with and not bacterial vaginosis.   If your symptoms don't get better with the diflucan, please follow the instructions to make a lab only appointment for the swab test.    If needed, you can schedule the lab right here in [employer], or call [phone].  I will let you know when the results are back and next steps to take.     If you re not feeling better within 2-3 days, please schedule an appointment.  You can schedule an appointment right here in [employer], or call [phone]  If the visit is for the same symptoms as your eVisit, we ll refund the cost of your eVisit if seen within seven days.      Please contact me with any new questions or concerns.    Warmly,  Linda Martin MD

## 2023-07-11 ENCOUNTER — TRANSFERRED RECORDS (OUTPATIENT)
Dept: HEALTH INFORMATION MANAGEMENT | Facility: CLINIC | Age: 23
End: 2023-07-11
Payer: COMMERCIAL

## 2023-11-02 ENCOUNTER — MYC REFILL (OUTPATIENT)
Dept: PEDIATRICS | Facility: CLINIC | Age: 23
End: 2023-11-02
Payer: COMMERCIAL

## 2023-11-02 DIAGNOSIS — G43.719 INTRACTABLE CHRONIC MIGRAINE WITHOUT AURA AND WITHOUT STATUS MIGRAINOSUS: ICD-10-CM

## 2023-11-03 DIAGNOSIS — G43.719 INTRACTABLE CHRONIC MIGRAINE WITHOUT AURA AND WITHOUT STATUS MIGRAINOSUS: ICD-10-CM

## 2023-11-03 RX ORDER — SUMATRIPTAN 50 MG/1
TABLET, FILM COATED ORAL
Qty: 18 TABLET | Refills: 0 | OUTPATIENT
Start: 2023-11-03

## 2023-11-03 NOTE — TELEPHONE ENCOUNTER
Pending Prescriptions:                       Disp   Refills    SUMAtriptan (IMITREX) 50 MG tablet        18 tab*0            Sig: TAKE 1 TO 2 TABLETS BY MOUTH AT ONSET OF HEADACHE           FOR MIGRAINE. MAY REPEAT IN 2 HOURS. MAX OF 4           TABLETS IN 24 HOURS    RX is over a year old. Pt does have a ANW scheduled.

## 2023-11-03 NOTE — TELEPHONE ENCOUNTER
Sent patient a my chart message requesting how many she has taken in the last 90 days.    BHAKTI Delgado on 11/3/2023 at 8:37 AM

## 2023-11-04 RX ORDER — SUMATRIPTAN 50 MG/1
TABLET, FILM COATED ORAL
Qty: 18 TABLET | Refills: 1 | Status: SHIPPED | OUTPATIENT
Start: 2023-11-04 | End: 2024-02-22

## 2024-02-06 ENCOUNTER — MYC MEDICAL ADVICE (OUTPATIENT)
Dept: PEDIATRICS | Facility: CLINIC | Age: 24
End: 2024-02-06
Payer: COMMERCIAL

## 2024-02-22 ENCOUNTER — OFFICE VISIT (OUTPATIENT)
Dept: PEDIATRICS | Facility: CLINIC | Age: 24
End: 2024-02-22
Payer: COMMERCIAL

## 2024-02-22 VITALS
WEIGHT: 245 LBS | HEART RATE: 97 BPM | TEMPERATURE: 97.7 F | DIASTOLIC BLOOD PRESSURE: 70 MMHG | OXYGEN SATURATION: 97 % | BODY MASS INDEX: 40.82 KG/M2 | RESPIRATION RATE: 16 BRPM | HEIGHT: 65 IN | SYSTOLIC BLOOD PRESSURE: 118 MMHG

## 2024-02-22 DIAGNOSIS — G43.009 MIGRAINE WITHOUT AURA AND WITHOUT STATUS MIGRAINOSUS, NOT INTRACTABLE: ICD-10-CM

## 2024-02-22 DIAGNOSIS — Z00.00 ROUTINE HISTORY AND PHYSICAL EXAMINATION OF ADULT: Primary | ICD-10-CM

## 2024-02-22 DIAGNOSIS — N92.0 MENORRHAGIA WITH REGULAR CYCLE: ICD-10-CM

## 2024-02-22 DIAGNOSIS — J45.21 MILD INTERMITTENT ASTHMA WITH EXACERBATION: ICD-10-CM

## 2024-02-22 DIAGNOSIS — G44.209 TENSION HEADACHE: ICD-10-CM

## 2024-02-22 LAB
ALBUMIN SERPL BCG-MCNC: 4.5 G/DL (ref 3.5–5.2)
ALP SERPL-CCNC: 81 U/L (ref 40–150)
ALT SERPL W P-5'-P-CCNC: 17 U/L (ref 0–50)
ANION GAP SERPL CALCULATED.3IONS-SCNC: 11 MMOL/L (ref 7–15)
AST SERPL W P-5'-P-CCNC: 16 U/L (ref 0–45)
BILIRUB SERPL-MCNC: 0.2 MG/DL
BUN SERPL-MCNC: 10.9 MG/DL (ref 6–20)
CALCIUM SERPL-MCNC: 9.3 MG/DL (ref 8.6–10)
CHLORIDE SERPL-SCNC: 103 MMOL/L (ref 98–107)
CHOLEST SERPL-MCNC: 199 MG/DL
CREAT SERPL-MCNC: 0.68 MG/DL (ref 0.51–0.95)
DEPRECATED HCO3 PLAS-SCNC: 24 MMOL/L (ref 22–29)
EGFRCR SERPLBLD CKD-EPI 2021: >90 ML/MIN/1.73M2
ERYTHROCYTE [DISTWIDTH] IN BLOOD BY AUTOMATED COUNT: 13.5 % (ref 10–15)
FASTING STATUS PATIENT QL REPORTED: NO
GLUCOSE SERPL-MCNC: 87 MG/DL (ref 70–99)
HBA1C MFR BLD: 5.3 % (ref 0–5.6)
HCT VFR BLD AUTO: 41.6 % (ref 35–47)
HDLC SERPL-MCNC: 34 MG/DL
HGB BLD-MCNC: 13 G/DL (ref 11.7–15.7)
IRON BINDING CAPACITY (ROCHE): 312 UG/DL (ref 240–430)
IRON SATN MFR SERPL: 16 % (ref 15–46)
IRON SERPL-MCNC: 50 UG/DL (ref 37–145)
LDLC SERPL CALC-MCNC: 89 MG/DL
MCH RBC QN AUTO: 27.5 PG (ref 26.5–33)
MCHC RBC AUTO-ENTMCNC: 31.3 G/DL (ref 31.5–36.5)
MCV RBC AUTO: 88 FL (ref 78–100)
NONHDLC SERPL-MCNC: 165 MG/DL
PLATELET # BLD AUTO: 418 10E3/UL (ref 150–450)
POTASSIUM SERPL-SCNC: 4.5 MMOL/L (ref 3.4–5.3)
PROT SERPL-MCNC: 7.6 G/DL (ref 6.4–8.3)
RBC # BLD AUTO: 4.73 10E6/UL (ref 3.8–5.2)
SODIUM SERPL-SCNC: 138 MMOL/L (ref 135–145)
TRIGL SERPL-MCNC: 378 MG/DL
TSH SERPL DL<=0.005 MIU/L-ACNC: 0.92 UIU/ML (ref 0.3–4.2)
WBC # BLD AUTO: 10.3 10E3/UL (ref 4–11)

## 2024-02-22 PROCEDURE — 36415 COLL VENOUS BLD VENIPUNCTURE: CPT | Performed by: PEDIATRICS

## 2024-02-22 PROCEDURE — 85027 COMPLETE CBC AUTOMATED: CPT | Performed by: PEDIATRICS

## 2024-02-22 PROCEDURE — 80053 COMPREHEN METABOLIC PANEL: CPT | Performed by: PEDIATRICS

## 2024-02-22 PROCEDURE — 83540 ASSAY OF IRON: CPT | Performed by: PEDIATRICS

## 2024-02-22 PROCEDURE — 83036 HEMOGLOBIN GLYCOSYLATED A1C: CPT | Performed by: PEDIATRICS

## 2024-02-22 PROCEDURE — 83550 IRON BINDING TEST: CPT | Performed by: PEDIATRICS

## 2024-02-22 PROCEDURE — 84443 ASSAY THYROID STIM HORMONE: CPT | Performed by: PEDIATRICS

## 2024-02-22 PROCEDURE — 99395 PREV VISIT EST AGE 18-39: CPT | Performed by: PEDIATRICS

## 2024-02-22 PROCEDURE — 80061 LIPID PANEL: CPT | Performed by: PEDIATRICS

## 2024-02-22 RX ORDER — SUMATRIPTAN 50 MG/1
TABLET, FILM COATED ORAL
Qty: 18 TABLET | Refills: 11 | Status: SHIPPED | OUTPATIENT
Start: 2024-02-22

## 2024-02-22 RX ORDER — ALBUTEROL SULFATE 90 UG/1
2 AEROSOL, METERED RESPIRATORY (INHALATION) EVERY 4 HOURS PRN
Qty: 18 G | Refills: 1 | Status: SHIPPED | OUTPATIENT
Start: 2024-02-22

## 2024-02-22 RX ORDER — CYCLOBENZAPRINE HCL 10 MG
5-10 TABLET ORAL 3 TIMES DAILY PRN
Qty: 30 TABLET | Refills: 4 | Status: SHIPPED | OUTPATIENT
Start: 2024-02-22

## 2024-02-22 SDOH — HEALTH STABILITY: PHYSICAL HEALTH: ON AVERAGE, HOW MANY DAYS PER WEEK DO YOU ENGAGE IN MODERATE TO STRENUOUS EXERCISE (LIKE A BRISK WALK)?: 4 DAYS

## 2024-02-22 ASSESSMENT — ASTHMA QUESTIONNAIRES
QUESTION_5 LAST FOUR WEEKS HOW WOULD YOU RATE YOUR ASTHMA CONTROL: COMPLETELY CONTROLLED
QUESTION_4 LAST FOUR WEEKS HOW OFTEN HAVE YOU USED YOUR RESCUE INHALER OR NEBULIZER MEDICATION (SUCH AS ALBUTEROL): NOT AT ALL
ACT_TOTALSCORE: 25
QUESTION_3 LAST FOUR WEEKS HOW OFTEN DID YOUR ASTHMA SYMPTOMS (WHEEZING, COUGHING, SHORTNESS OF BREATH, CHEST TIGHTNESS OR PAIN) WAKE YOU UP AT NIGHT OR EARLIER THAN USUAL IN THE MORNING: NOT AT ALL
ACT_TOTALSCORE: 25
QUESTION_2 LAST FOUR WEEKS HOW OFTEN HAVE YOU HAD SHORTNESS OF BREATH: NOT AT ALL
QUESTION_1 LAST FOUR WEEKS HOW MUCH OF THE TIME DID YOUR ASTHMA KEEP YOU FROM GETTING AS MUCH DONE AT WORK, SCHOOL OR AT HOME: NONE OF THE TIME

## 2024-02-22 ASSESSMENT — SOCIAL DETERMINANTS OF HEALTH (SDOH): HOW OFTEN DO YOU GET TOGETHER WITH FRIENDS OR RELATIVES?: ONCE A WEEK

## 2024-02-22 ASSESSMENT — PAIN SCALES - GENERAL: PAINLEVEL: NO PAIN (0)

## 2024-02-22 NOTE — PROGRESS NOTES
"Pre[facility] Care Visit  [facility]  Feb 22, 2024    Assessment & Plan       ICD-10-CM    1. Routine history and physical examination of adult  Z00.00 TSH with free T4 reflex     Comprehensive metabolic panel (BMP + Alb, Alk Phos, ALT, AST, Total. Bili, TP)     Lipid panel reflex to direct LDL Fasting     Hemoglobin A1c     CBC with platelets     Iron and iron binding capacity    Prefers to return for Pap  Politely declines additional vaccines      2. Mild intermittent asthma with exacerbation  J45.21 albuterol (PROAIR HFA/PROVENTIL HFA/VENTOLIN HFA) 108 (90 Base) MCG/ACT inhaler    Very rare symptoms, well controlled      3. Menorrhagia with regular cycle  N92.0 CBC with platelets     Iron and iron binding capacity      4. Intractable chronic migraine without aura and without status migrainosus  G43.719 SUMAtriptan (IMITREX) 50 MG tablet  Well controlled, continue current medications        5. Tension headache  G44.209 cyclobenzaprine (FLEXERIL) 10 MG tablet  Well controlled, continue current medications        6. BMI 40.0-44.9, adult (H)  Z68.41 Discussed dietary and movement recommendations              BMI  Estimated body mass index is 41.07 kg/m  as calculated from the following:    Height as of this encounter: 1.645 m (5' 4.76\").    Weight as of this encounter: 111.1 kg (245 lb).   Weight management plan: Discussed healthy diet and exercise guidelines    Counseling  Appropriate preventive services were discussed with this patient, including applicable screening as appropriate for fall prevention, nutrition, physical activity, Tobacco-use cessation, weight loss and cognition.  Checklist reviewing preventive services available has been given to the patient.  Reviewed patient's diet, addressing concerns and/or questions.   She is at risk for psychosocial distress and has been provided with information to reduce risk.       See Patient " Joe Gallo is a 23 year old, presenting for the following:  Physical        2/22/2024    11:17 AM   Additional Questions   Roomed by Cassie ANDREW   Accompanied by Self         2/22/2024    11:17 AM   Patient Reported Additional Medications   Patient reports taking the following new medications n/a        Health Care Directive  Patient does not have a Health Care Directive or Living Will: Discussed advance care planning with patient; however, patient declined at this time.    HPI    EDM     Traffic control -         2/22/2024   General Health   How would you rate your overall physical health? Good   Feel stress (tense, anxious, or unable to sleep) Only a little   (!) STRESS CONCERN      2/22/2024   Nutrition   Three or more servings of calcium each day? Yes   Diet: Regular (no restrictions)   How many servings of fruit and vegetables per day? (!) 0-1   How many sweetened beverages each day? 0-1         2/22/2024   Exercise   Days per week of moderate/strenous exercise 4 days         2/22/2024   Social Factors   Frequency of gathering with friends or relatives Once a week   Worry food won't last until get money to buy more No   Food not last or not have enough money for food? No   Do you have housing?  Yes   Are you worried about losing your housing? No   Lack of transportation? No   Unable to get utilities (heat,electricity)? No         2/22/2024   Dental   Dentist two times every year? Yes         2/22/2024   TB Screening   Were you born outside of [place]?  No         Today's PHQ-2 Score:       2/22/2024    11:10 AM   PHQ-2 ( 1999 [employer])   Q1: Little interest or pleasure in doing things 0   Q2: Feeling down, depressed or hopeless 0   PHQ-2 Score 0   Q1: Little interest or pleasure in doing things Not at all   Q2: Feeling down, depressed or hopeless Not at all   PHQ-2 Score 0           2/22/2024   Substance Use   Alcohol more than 3/day or more than 7/wk No   Do you use any other substances  "recreationally? No     Social History     Tobacco Use    Smoking status: Former     Years: 1     Types: Cigarettes, Vaping Device     Passive exposure: Never    Smokeless tobacco: Never    Tobacco comments:     Smokes 1 cigarette a month since May 2016. Vaping nicotine intermittently as of 2/2/23.    Vaping Use    Vaping Use: Every day    Substances: Nicotine    Devices: Disposable   Substance Use Topics    Alcohol use: No     Alcohol/week: 0.0 standard drinks of alcohol    Drug use: No             2/22/2024   Breast Cancer Screening   Family history of breast, colon, or ovarian cancer? No / Unknown      Mammogram Screening - Patient under 40 years of age: Routine Mammogram Screening not recommended.         2/22/2024   STI Screening   New sexual partner(s) since last STI/HIV test? No     History of abnormal Pap smear: NO - age 21-29 PAP every 3 years recommended        11/18/2020     4:10 PM   PAP / HPV   PAP (Historical) NIL            2/22/2024   Contraception/Family Planning   Questions about contraception or family planning No        Reviewed and updated as needed this visit by Provider                  Migraines - no aura, uses imitrex for triggers with good relief.  No new neurologic symptoms.  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    Plans to move to [place] and then to [place] within the next year    Currently single, no STI concerns         ROS: 10 point ROS neg other than the symptoms noted above in the HPI.       Objective    Exam  /70   Pulse 97   Temp 97.7  F (36.5  C) (Temporal)   Resp 16   Ht 1.645 m (5' 4.76\")   Wt 111.1 kg (245 lb)   LMP 02/21/2024   SpO2 97%   BMI 41.07 kg/m   " "  Estimated body mass index is 41.07 kg/m  as calculated from the following:    Height as of this encounter: 1.645 m (5' 4.76\").    Weight as of this encounter: 111.1 kg (245 lb).    Physical Exam  GENERAL: alert and no distress  EYES: Eyes grossly normal to inspection, PERRL and conjunctivae and sclerae normal  HENT: ear canals and TM's normal, nose and mouth without ulcers or lesions  NECK: no adenopathy, no asymmetry, masses, or scars  RESP: lungs clear to auscultation - no rales, rhonchi or wheezes  CV: regular rate and rhythm, normal S1 S2, no S3 or S4, no murmur, click or rub, no peripheral edema  ABDOMEN: soft, nontender, no hepatosplenomegaly, no masses and bowel sounds normal  MS: no gross musculoskeletal defects noted, no edema  SKIN: no suspicious lesions or rashes  NEURO: Normal strength and tone, mentation intact and speech normal  PSYCH: mentation appears normal, affect normal/bright      Signed Electronically by: Linda Martin MD    "

## 2024-02-22 NOTE — PATIENT INSTRUCTIONS
Weight lifting gym    Labs today    Track nutrition - My Fitness Pal or Lose It are great free apps    High protein breakfast - try not to come home from work super hungry

## 2024-02-22 NOTE — LETTER
My Asthma Action Plan    Name: Cleo Flores   YOB: 2000  Date: 2/22/2024   My doctor: Linda Martin MD   My clinic: [facility]        My Rescue Medicine:   Albuterol inhaler (Proair/Ventolin/Proventil HFA)  2-4 puffs EVERY 4 HOURS as needed. Use a spacer if recommended by your provider.   My Asthma Severity:   Intermittent / Exercise Induced  Know your asthma triggers: upper respiratory infections             GREEN ZONE   Good Control  I feel good  No cough or wheeze  Can work, sleep and play without asthma symptoms       Take your asthma control medicine every day.     If exercise triggers your asthma, take your rescue medication  15 minutes before exercise or sports, and  During exercise if you have asthma symptoms  Spacer to use with inhaler: If you have a spacer, make sure to use it with your inhaler             YELLOW ZONE Getting Worse  I have ANY of these:  I do not feel good  Cough or wheeze  Chest feels tight  Wake up at night   Keep taking your Green Zone medications  Start taking your rescue medicine:  every 20 minutes for up to 1 hour. Then every 4 hours for 24-48 hours.  If you stay in the Yellow Zone for more than 12-24 hours, contact your doctor.  If you do not return to the Green Zone in 12-24 hours or you get worse, start taking your oral steroid medicine if prescribed by your provider.           RED ZONE Medical Alert - Get Help  I have ANY of these:  I feel awful  Medicine is not helping  Breathing getting harder  Trouble walking or talking  Nose opens wide to breathe       Take your rescue medicine NOW  If your provider has prescribed an oral steroid medicine, start taking it NOW  Call your doctor NOW  If you are still in the Red Zone after 20 minutes and you have not reached your doctor:  Take your rescue medicine again and  Call 911 or go to the emergency room right away    See your regular doctor within 2 weeks of an Emergency Room or Urgent Care  visit for follow-up treatment.          Annual Reminders:  Meet with Asthma Educator,  Flu Shot in the Fall, consider Pneumonia Vaccination for patients with asthma (aged 19 and older).    Pharmacy: [facility] #[id number] [address] AT [facility] [place] [place] [address]    Electronically signed by Linda Martin MD   Date: 02/22/24                    Asthma Triggers  How To Control Things That Make Your Asthma Worse    Triggers are things that make your asthma worse.  Look at the list below to help you find your triggers and   what you can do about them. You can help prevent asthma flare-ups by staying away from your triggers.      Trigger                                                          What you can do   Cigarette Smoke  Tobacco smoke can make asthma worse. Do not allow smoking in your home, car or around you.  Be sure no one smokes at a child s day care or school.  If you smoke, ask your health care provider for ways to help you quit.  Ask family members to quit too.  Ask your health care provider for a referral to Quit Plan to help you quit smoking, or call 3-646-158-PLAN.     Colds, Flu, Bronchitis  These are common triggers of asthma. Wash your hands often.  Don t touch your eyes, nose or mouth.  Get a flu shot every year.     Dust Mites  These are tiny bugs that live in cloth or carpet. They are too small to see. Wash sheets and blankets in hot water every week.   Encase pillows and mattress in dust mite proof covers.  Avoid having carpet if you can. If you have carpet, vacuum weekly.   Use a dust mask and HEPA vacuum.   Pollen and Outdoor Mold  Some people are allergic to trees, grass, or weed pollen, or molds. Try to keep your windows closed.  Limit time out doors when pollen count is high.   Ask you health care provider about taking medicine during allergy season.     Animal Dander  Some people are allergic to skin flakes, urine or saliva from pets with fur  or feathers. Keep pets with fur or feathers out of your home.    If you can t keep the pet outdoors, then keep the pet out of your bedroom.  Keep the bedroom door closed.  Keep pets off cloth furniture and away from stuffed toys.     Mice, Rats, and Cockroaches  Some people are allergic to the waste from these pests.   Cover food and garbage.  Clean up spills and food crumbs.  Store grease in the refrigerator.   Keep food out of the bedroom.   Indoor Mold  This can be a trigger if your home has high moisture. Fix leaking faucets, pipes, or other sources of water.   Clean moldy surfaces.  Dehumidify basement if it is damp and smelly.   Smoke, Strong Odors, and Sprays  These can reduce air quality. Stay away from strong odors and sprays, such as perfume, powder, hair spray, paints, smoke incense, paint, cleaning products, candles and new carpet.   Exercise or Sports  Some people with asthma have this trigger. Be active!  Ask your doctor about taking medicine before sports or exercise to prevent symptoms.    Warm up for 5-10 minutes before and after sports or exercise.     Other Triggers of Asthma  Cold air:  Cover your nose and mouth with a scarf.  Sometimes laughing or crying can be a trigger.  Some medicines and food can trigger asthma.

## 2024-02-26 ENCOUNTER — TELEPHONE (OUTPATIENT)
Dept: PEDIATRICS | Facility: CLINIC | Age: 24
End: 2024-02-26
Payer: COMMERCIAL

## 2024-02-26 NOTE — TELEPHONE ENCOUNTER
Prior Authorization Specialty Medication Request    Medication/Dose: SUMAtriptan (IMITREX) 50 MG tablet    Diagnosis and ICD code  Migraine without aura and without status migrainosus, not intractable [G43.009]        Insurance   Primary: Hydrocapsule ACCESS  Insurance ID:  [id number]

## 2024-03-08 NOTE — TELEPHONE ENCOUNTER
Central Prior Authorization Team   Phone: [phone]    PA Initiation    Medication: SUMAtriptan (IMITREX) 50 MG tablet  Insurance Company: [employer] - Phone [phone] Fax [phone]  Pharmacy Filling the Rx: [facility] #[id number] [address]  Filling Pharmacy Phone: [phone]  Filling Pharmacy Fax:    Start Date: 3/8/2024

## 2024-03-08 NOTE — TELEPHONE ENCOUNTER
Prior Authorization Not Needed per Insurance    Medication: SUMAtriptan (IMITREX) 50 MG tablet  Insurance Company: [employer] - Phone [phone] Fax [phone]  Expected CoPay:      Pharmacy Filling the Rx: [facility] #[id number] [address]  Pharmacy Notified:  Yes  Patient Notified:  **Instructed pharmacy to notify patient when script is ready to /ship.**

## 2024-04-02 ENCOUNTER — E-VISIT (OUTPATIENT)
Dept: PEDIATRICS | Facility: CLINIC | Age: 24
End: 2024-04-02
Payer: COMMERCIAL

## 2024-04-02 DIAGNOSIS — N76.0 VAGINITIS AND VULVOVAGINITIS: Primary | ICD-10-CM

## 2024-04-02 PROCEDURE — 99421 OL DIG E/M SVC 5-10 MIN: CPT | Performed by: PEDIATRICS

## 2024-04-03 RX ORDER — METRONIDAZOLE 500 MG/1
500 TABLET ORAL 2 TIMES DAILY
Qty: 14 TABLET | Refills: 0 | Status: SHIPPED | OUTPATIENT
Start: 2024-04-03 | End: 2024-04-10

## 2024-04-03 RX ORDER — FLUCONAZOLE 150 MG/1
150 TABLET ORAL ONCE
Qty: 1 TABLET | Refills: 0 | Status: SHIPPED | OUTPATIENT
Start: 2024-04-03 | End: 2024-04-03

## 2025-03-23 ENCOUNTER — HEALTH MAINTENANCE LETTER (OUTPATIENT)
Age: 25
End: 2025-03-23